# Patient Record
Sex: MALE | Race: WHITE | Employment: UNEMPLOYED | URBAN - METROPOLITAN AREA
[De-identification: names, ages, dates, MRNs, and addresses within clinical notes are randomized per-mention and may not be internally consistent; named-entity substitution may affect disease eponyms.]

---

## 2017-05-24 ENCOUNTER — APPOINTMENT (OUTPATIENT)
Dept: CT IMAGING | Facility: HOSPITAL | Age: 57
End: 2017-05-24
Attending: EMERGENCY MEDICINE
Payer: MEDICAID

## 2017-05-24 ENCOUNTER — APPOINTMENT (OUTPATIENT)
Dept: GENERAL RADIOLOGY | Facility: HOSPITAL | Age: 57
End: 2017-05-24
Attending: EMERGENCY MEDICINE
Payer: MEDICAID

## 2017-05-24 PROCEDURE — 70450 CT HEAD/BRAIN W/O DYE: CPT | Performed by: EMERGENCY MEDICINE

## 2017-05-24 PROCEDURE — 71010 XR CHEST AP PORTABLE  (CPT=71010): CPT | Performed by: EMERGENCY MEDICINE

## 2017-05-24 NOTE — ED NOTES
Call received from [facility] from [facility]. Addressed concerns over condition of patient's home. Nichols stated the house was messy, but not dilapidated and unsafe for him to return home. OXANA notified.

## 2017-05-24 NOTE — ED NOTES
Unable to obtain patient history. He is uncooperative and not answering questions. He is only concerned about the care of his wife. Spoke with wife's caregiver who says patient has been altered since last night.  He called her at 12am asking her for help an

## 2017-05-24 NOTE — ED INITIAL ASSESSMENT (HPI)
Patient presents with EMS after calling 911 three times and hanging up. The  checked on the patient who found him to be altered from his norm. Per EMS, patient's home is dirty with 12 cats and animal waste throughout the house.  He did not allow for

## 2017-05-24 NOTE — ED NOTES
Patient was given the remainder of his personal belongings. Mother is en route to  patient. Patient verbalizes understanding that mother is required to come into the department to pick patient up.

## 2017-05-24 NOTE — ED PROVIDER NOTES
Patient Seen in: [facility] Emergency Department    History   Patient presents with:  Altered Mental Status (neurologic)  Eval-P (psychiatric)    Stated Complaint: ETOH    HPI    Patient called EMS 3 times and hung up.   Careful not to do well being swelling, deformity   Cardiovascular: Regular rate and rhythm, normal S1 and S2, no murmurs, rubs, or gallops   Lungs: Clear to auscultation bilaterally with equal breath sounds, no wheezing, no rhonchi   Abdomen: Positive bowel sounds,  soft, no tendernes RAINBOW DRAW GOLD   RAINBOW DRAW LAVENDER   RAINBOW DRAW LIGHT GREEN      EKG    Rate, intervals and axes as noted on EKG Report.   Rate: 108  Rhythm: Sinus Rhythm  Reading: No acute process          CT brain no acute process  Chest x-ray normal  MDM

## 2017-05-24 NOTE — ED NOTES
Patient assessed. Ambulated to the bathroom with steady gait. Speech is clear and normal. Patient's strength is equal in all extremities. Denies CIWA indicator symptoms.

## 2017-05-24 NOTE — ED NOTES
Received call from patient's wife. She is concerned about his wellbeing. Endorses that the patient has been falling a lot lately and unable to stand.  A few weeks ago he told her he needed something for pain and took some of her Baclofen, which \"doesn't mi

## 2017-05-24 NOTE — ED NOTES
Patient reports he fell 2 days ago, landed on his back, and hit his head on the hardwood floor. ERMD notified. CT head ordered.

## 2017-05-24 NOTE — ED NOTES
Contacted Mike COOK regarding EMS reports of animal waste and concern for safety of living conditions at home. Spoke with Michele Gresham [facility] Department was primary responding department on the call this morning. Will contact [place].

## 2017-05-24 NOTE — ED NOTES
Spoke with [facility] Department; responding officer will return call.

## 2017-05-24 NOTE — ED PROVIDER NOTES
Patient observed in the ER remained awake alert and appropriate able to ambulate steadily and will be picked up by his mother.

## 2018-09-13 ENCOUNTER — CHARTING TRANS (OUTPATIENT)
Dept: OTHER | Age: 58
End: 2018-09-13

## 2018-09-14 ENCOUNTER — CHARTING TRANS (OUTPATIENT)
Dept: OTHER | Age: 58
End: 2018-09-14

## 2018-11-23 ENCOUNTER — IMAGING SERVICES (OUTPATIENT)
Dept: OTHER | Age: 58
End: 2018-11-23

## 2018-12-18 ENCOUNTER — APPOINTMENT (OUTPATIENT)
Dept: CT IMAGING | Facility: HOSPITAL | Age: 58
DRG: 897 | End: 2018-12-18
Attending: EMERGENCY MEDICINE
Payer: MEDICAID

## 2018-12-18 PROBLEM — F10.920 ALCOHOLIC INTOXICATION WITHOUT COMPLICATION (HCC): Status: ACTIVE | Noted: 2018-12-18

## 2018-12-18 PROBLEM — F10.920 ALCOHOLIC INTOXICATION WITHOUT COMPLICATION: Status: ACTIVE | Noted: 2018-12-18

## 2018-12-18 PROCEDURE — 70450 CT HEAD/BRAIN W/O DYE: CPT | Performed by: EMERGENCY MEDICINE

## 2018-12-19 NOTE — PROGRESS NOTES
NURSING DISCHARGE NOTE    Discharged Home via Wheelchair. Accompanied by Support staff  Belongings Taken by patient/family     Reviewed discharge instructions and med rec with patient- verbalized understanding. Removed PIV and tele.  Pt left the unit i

## 2018-12-19 NOTE — PLAN OF CARE
DISCHARGE PLANNING    • Discharge to home or other facility with appropriate resources Progressing        METABOLIC/FLUID AND ELECTROLYTES - ADULT    • Electrolytes maintained within normal limits Progressing        Patient/Family Goals    • Patient/Family

## 2018-12-19 NOTE — PROGRESS NOTES
NURSING ADMISSION NOTE      Patient admitted via Cart  Oriented to room. Safety precautions initiated. Bed in low position. Call light in reach. CIWA protocol, scored 7. 1 mg PO ativan given. Tremors and anxiety noted.   Pt states he's \"anxious

## 2018-12-19 NOTE — PLAN OF CARE
DISCHARGE PLANNING    • Discharge to home or other facility with appropriate resources Progressing        METABOLIC/FLUID AND ELECTROLYTES - ADULT    • Electrolytes maintained within normal limits Progressing        SAFETY ADULT - FALL    • Free from fall

## 2018-12-19 NOTE — ED PROVIDER NOTES
Patient Seen in: [facility] Emergency Department    History   Patient presents with:  Eval-P (psychiatric)    Stated Complaint: ETOH withdraw    HPI    Bettina Keating is a pleasant 60-year-old male presenting to the emergency department for alcohol withdra or erythema.   No submandibular erythema and no tenderness along the sternocleidomastoid and no nuchal rigidity  Lungs are clear to auscultation bilaterally with no wheezes retractions or rhonchi noted  Cardiovascular exam shows a regular rate and rhythm  A treatment for his alcohol abuse issues is a history of withdrawal seizures he would be admitted to the [facility] psychiatric area for further evaluation.             Disposition and Plan     Clinical Impression:  Alcoholic intoxication without compl

## 2018-12-19 NOTE — BH PROGRESS NOTE
Went to see the pt for f/u with his etoh history. He stated he drinks 1 bottle of wine per day. He repeats that he came into the hospital to detox because he was afraid of having a seizure again.   He said, when he tried to detox himself earlier this year

## 2018-12-19 NOTE — H&P
[facility] Hospitalist History and Physical      Patient presents with:  Eval-P (psychiatric)       PCP: Sherrill Avalos MD      History of Present Illness: Patient is a 63year old male with PMH sig for HTN, possible seizure do presents for wanting detox.      He ha and gums normal.   Neck: Supple, symmetrical, trachea midline, no cervical or supraclavicular lymph adenopathy, thyroid: no enlargment/tenderness/nodules appreciated   Lungs:   Clear to auscultation bilaterally.  Normal effort   Chest wall:  No tenderness o no midline shift. There are no acute intraparenchymal brain abnormalities. There is nothing specific for acute infarct. There is no hemorrhage or mass lesion. The craniocervical junction is normal. There is normal gray-white matter differentiation.   SIN

## 2018-12-19 NOTE — ED INITIAL ASSESSMENT (HPI)
Pt here with co alcohol dependence, states hx of DTs with seizures in the past. Last drink was 4 hours ago.

## 2018-12-20 NOTE — PAYOR COMM NOTE
--------------  ADMISSION REVIEW     Payor: [address] [place] [address] #:  [id number]  Authorization Number: [id number]/A    Admit date: 12/18/18  Admit time: 2313       Admitting Physician: Donavan Crain MD  Attending Physician:  No att. providers found  Primary C RAINBOW DRAW LAVENDER   RAINBOW DRAW LIGHT GREEN   RAINBOW DRAW GOLD     EKG    Rate, intervals and axes as noted on EKG Report.   Rate: 94  Rhythm: Sinus Rhythm  Reading: Incomplete right bundle branch block                 MEDICATIONS ADMINISTERED IN [place]

## 2019-01-24 ENCOUNTER — IMAGING SERVICES (OUTPATIENT)
Dept: OTHER | Age: 59
End: 2019-01-24

## 2020-08-17 ENCOUNTER — APPOINTMENT (OUTPATIENT)
Dept: CT IMAGING | Age: 60
DRG: 178 | End: 2020-08-17
Attending: EMERGENCY MEDICINE
Payer: MEDICAID

## 2020-08-17 ENCOUNTER — APPOINTMENT (OUTPATIENT)
Dept: GENERAL RADIOLOGY | Age: 60
DRG: 178 | End: 2020-08-17
Attending: EMERGENCY MEDICINE
Payer: MEDICAID

## 2020-08-17 ENCOUNTER — HOSPITAL ENCOUNTER (INPATIENT)
Facility: HOSPITAL | Age: 60
LOS: 10 days | Discharge: NURSING, REHAB OR LONG-TERM CARE | DRG: 178 | End: 2020-08-28
Attending: EMERGENCY MEDICINE | Admitting: HOSPITALIST
Payer: MEDICAID

## 2020-08-17 DIAGNOSIS — F10.220 ALCOHOL DEPENDENCE WITH UNCOMPLICATED INTOXICATION (HCC): ICD-10-CM

## 2020-08-17 DIAGNOSIS — R00.2 PALPITATIONS: ICD-10-CM

## 2020-08-17 DIAGNOSIS — E87.1 HYPONATREMIA: Primary | ICD-10-CM

## 2020-08-17 DIAGNOSIS — R06.00 DYSPNEA, UNSPECIFIED TYPE: ICD-10-CM

## 2020-08-17 LAB
ALBUMIN SERPL-MCNC: 4.3 G/DL (ref 3.4–5)
ALBUMIN/GLOB SERPL: 1.1 {RATIO} (ref 1–2)
ALP LIVER SERPL-CCNC: 138 U/L (ref 45–117)
ALT SERPL-CCNC: 148 U/L (ref 16–61)
ANION GAP SERPL CALC-SCNC: 9 MMOL/L (ref 0–18)
APAP SERPL-MCNC: <2 UG/ML (ref 10–30)
APTT PPP: 27.6 SECONDS (ref 25.4–36.1)
AST SERPL-CCNC: 289 U/L (ref 15–37)
BASOPHILS # BLD AUTO: 0.03 X10(3) UL (ref 0–0.2)
BASOPHILS NFR BLD AUTO: 0.8 %
BILIRUB SERPL-MCNC: 0.4 MG/DL (ref 0.1–2)
BUN BLD-MCNC: 4 MG/DL (ref 7–18)
BUN/CREAT SERPL: 5.7 (ref 10–20)
CALCIUM BLD-MCNC: 8.9 MG/DL (ref 8.5–10.1)
CHLORIDE SERPL-SCNC: 93 MMOL/L (ref 98–112)
CO2 SERPL-SCNC: 25 MMOL/L (ref 21–32)
CREAT BLD-MCNC: 0.7 MG/DL (ref 0.7–1.3)
D-DIMER: 6.03 UG/ML FEU (ref ?–0.59)
DEPRECATED RDW RBC AUTO: 41.6 FL (ref 35.1–46.3)
EOSINOPHIL # BLD AUTO: 0.01 X10(3) UL (ref 0–0.7)
EOSINOPHIL NFR BLD AUTO: 0.3 %
ERYTHROCYTE [DISTWIDTH] IN BLOOD BY AUTOMATED COUNT: 12.2 % (ref 11–15)
ETHANOL SERPL-MCNC: 283 MG/DL (ref ?–3)
GLOBULIN PLAS-MCNC: 3.8 G/DL (ref 2.8–4.4)
GLUCOSE BLD-MCNC: 92 MG/DL (ref 70–99)
HCT VFR BLD AUTO: 40 % (ref 39–53)
HGB BLD-MCNC: 14.4 G/DL (ref 13–17.5)
IMM GRANULOCYTES # BLD AUTO: 0.02 X10(3) UL (ref 0–1)
IMM GRANULOCYTES NFR BLD: 0.5 %
INR BLD: 0.93 (ref 0.88–1.11)
LYMPHOCYTES # BLD AUTO: 0.46 X10(3) UL (ref 1–4)
LYMPHOCYTES NFR BLD AUTO: 11.9 %
M PROTEIN MFR SERPL ELPH: 8.1 G/DL (ref 6.4–8.2)
MCH RBC QN AUTO: 33.6 PG (ref 26–34)
MCHC RBC AUTO-ENTMCNC: 36 G/DL (ref 31–37)
MCV RBC AUTO: 93.2 FL (ref 80–100)
MONOCYTES # BLD AUTO: 0.49 X10(3) UL (ref 0.1–1)
MONOCYTES NFR BLD AUTO: 12.7 %
NEUTROPHILS # BLD AUTO: 2.85 X10 (3) UL (ref 1.5–7.7)
NEUTROPHILS # BLD AUTO: 2.85 X10(3) UL (ref 1.5–7.7)
NEUTROPHILS NFR BLD AUTO: 73.8 %
OSMOLALITY SERPL CALC.SUM OF ELEC: 261 MOSM/KG (ref 275–295)
PLATELET # BLD AUTO: 100 10(3)UL (ref 150–450)
POTASSIUM SERPL-SCNC: 3.8 MMOL/L (ref 3.5–5.1)
PSA SERPL DL<=0.01 NG/ML-MCNC: 12.4 SECONDS (ref 12–14.3)
RBC # BLD AUTO: 4.29 X10(6)UL (ref 4.3–5.7)
SALICYLATES SERPL-MCNC: 8.8 MG/DL (ref 2.8–20)
SODIUM SERPL-SCNC: 127 MMOL/L (ref 136–145)
TROPONIN I SERPL-MCNC: <0.045 NG/ML (ref ?–0.04)
WBC # BLD AUTO: 3.9 X10(3) UL (ref 4–11)

## 2020-08-17 PROCEDURE — 71275 CT ANGIOGRAPHY CHEST: CPT | Performed by: EMERGENCY MEDICINE

## 2020-08-17 PROCEDURE — 71045 X-RAY EXAM CHEST 1 VIEW: CPT | Performed by: EMERGENCY MEDICINE

## 2020-08-17 RX ORDER — SODIUM CHLORIDE 9 MG/ML
125 INJECTION, SOLUTION INTRAVENOUS CONTINUOUS
Status: DISCONTINUED | OUTPATIENT
Start: 2020-08-17 | End: 2020-08-18

## 2020-08-18 PROBLEM — R06.00 DYSPNEA, UNSPECIFIED TYPE: Status: ACTIVE | Noted: 2020-08-18

## 2020-08-18 PROBLEM — R00.2 PALPITATIONS: Status: ACTIVE | Noted: 2020-08-18

## 2020-08-18 PROBLEM — F10.220 ALCOHOL DEPENDENCE WITH UNCOMPLICATED INTOXICATION (HCC): Status: ACTIVE | Noted: 2020-08-18

## 2020-08-18 LAB
ALBUMIN SERPL-MCNC: 4 G/DL (ref 3.4–5)
ALBUMIN/GLOB SERPL: 1.2 {RATIO} (ref 1–2)
ALP LIVER SERPL-CCNC: 136 U/L (ref 45–117)
ALT SERPL-CCNC: 157 U/L (ref 16–61)
AMPHET UR QL SCN: NEGATIVE
ANION GAP SERPL CALC-SCNC: 10 MMOL/L (ref 0–18)
AST SERPL-CCNC: 345 U/L (ref 15–37)
ATRIAL RATE: 97 BPM
BENZODIAZ UR QL SCN: NEGATIVE
BILIRUB SERPL-MCNC: 0.5 MG/DL (ref 0.1–2)
BILIRUB UR QL STRIP.AUTO: NEGATIVE
BUN BLD-MCNC: 3 MG/DL (ref 7–18)
BUN/CREAT SERPL: 4.1 (ref 10–20)
C DIFF TOX B STL QL: NEGATIVE
CALCIUM BLD-MCNC: 9 MG/DL (ref 8.5–10.1)
CANNABINOIDS UR QL SCN: NEGATIVE
CHLORIDE SERPL-SCNC: 96 MMOL/L (ref 98–112)
CK SERPL-CCNC: 255 U/L (ref 39–308)
CLARITY UR REFRACT.AUTO: CLEAR
CO2 SERPL-SCNC: 24 MMOL/L (ref 21–32)
COCAINE UR QL: NEGATIVE
COLOR UR AUTO: YELLOW
CREAT BLD-MCNC: 0.73 MG/DL (ref 0.7–1.3)
CREAT UR-SCNC: 42.5 MG/DL
CRP SERPL-MCNC: <0.29 MG/DL (ref ?–0.3)
DEPRECATED HBV CORE AB SER IA-ACNC: 751.6 NG/ML (ref 30–530)
DEPRECATED RDW RBC AUTO: 43.9 FL (ref 35.1–46.3)
ERYTHROCYTE [DISTWIDTH] IN BLOOD BY AUTOMATED COUNT: 12.1 % (ref 11–15)
GLOBULIN PLAS-MCNC: 3.3 G/DL (ref 2.8–4.4)
GLUCOSE BLD-MCNC: 153 MG/DL (ref 70–99)
GLUCOSE BLD-MCNC: 68 MG/DL (ref 70–99)
GLUCOSE UR STRIP.AUTO-MCNC: NEGATIVE MG/DL
HAV IGM SER QL: 1.9 MG/DL (ref 1.6–2.6)
HCT VFR BLD AUTO: 38.9 % (ref 39–53)
HGB BLD-MCNC: 13.2 G/DL (ref 13–17.5)
LDH SERPL L TO P-CCNC: 403 U/L
LEUKOCYTE ESTERASE UR QL STRIP.AUTO: NEGATIVE
M PROTEIN MFR SERPL ELPH: 7.3 G/DL (ref 6.4–8.2)
MCH RBC QN AUTO: 33.2 PG (ref 26–34)
MCHC RBC AUTO-ENTMCNC: 33.9 G/DL (ref 31–37)
MCV RBC AUTO: 98 FL (ref 80–100)
MDMA UR QL SCN: NEGATIVE
NITRITE UR QL STRIP.AUTO: NEGATIVE
NT-PROBNP SERPL-MCNC: 55 PG/ML (ref ?–125)
OPIATES UR QL SCN: NEGATIVE
OSMOLALITY SERPL CALC.SUM OF ELEC: 265 MOSM/KG (ref 275–295)
OXYCODONE UR QL SCN: NEGATIVE
P AXIS: 66 DEGREES
P-R INTERVAL: 198 MS
PH UR STRIP.AUTO: 7 [PH] (ref 4.5–8)
PLATELET # BLD AUTO: 74 10(3)UL (ref 150–450)
POTASSIUM SERPL-SCNC: 4.1 MMOL/L (ref 3.5–5.1)
PROCALCITONIN SERPL-MCNC: <0.05 NG/ML (ref ?–0.16)
PROT UR STRIP.AUTO-MCNC: NEGATIVE MG/DL
Q-T INTERVAL: 352 MS
QRS DURATION: 110 MS
QTC CALCULATION (BEZET): 447 MS
R AXIS: -6 DEGREES
RBC # BLD AUTO: 3.97 X10(6)UL (ref 4.3–5.7)
RBC UR QL AUTO: NEGATIVE
SARS-COV-2 RNA RESP QL NAA+PROBE: DETECTED
SODIUM SERPL-SCNC: 130 MMOL/L (ref 136–145)
SP GR UR STRIP.AUTO: 1.01 (ref 1–1.03)
T AXIS: 46 DEGREES
UROBILINOGEN UR STRIP.AUTO-MCNC: <2 MG/DL
VENTRICULAR RATE: 97 BPM
WBC # BLD AUTO: 3.4 X10(3) UL (ref 4–11)

## 2020-08-18 PROCEDURE — HZ2ZZZZ DETOXIFICATION SERVICES FOR SUBSTANCE ABUSE TREATMENT: ICD-10-PCS | Performed by: INTERNAL MEDICINE

## 2020-08-18 RX ORDER — LORAZEPAM 2 MG/ML
2 INJECTION INTRAMUSCULAR ONCE
Status: COMPLETED | OUTPATIENT
Start: 2020-08-18 | End: 2020-08-18

## 2020-08-18 RX ORDER — DEXMEDETOMIDINE HYDROCHLORIDE 4 UG/ML
INJECTION, SOLUTION INTRAVENOUS CONTINUOUS
Status: DISCONTINUED | OUTPATIENT
Start: 2020-08-18 | End: 2020-08-24 | Stop reason: HOSPADM

## 2020-08-18 RX ORDER — BISACODYL 10 MG
10 SUPPOSITORY, RECTAL RECTAL
Status: DISCONTINUED | OUTPATIENT
Start: 2020-08-18 | End: 2020-08-28

## 2020-08-18 RX ORDER — ASPIRIN 81 MG/1
81 TABLET, CHEWABLE ORAL DAILY
Status: DISCONTINUED | OUTPATIENT
Start: 2020-08-18 | End: 2020-08-28

## 2020-08-18 RX ORDER — LORAZEPAM 2 MG/ML
2 INJECTION INTRAMUSCULAR
Status: DISCONTINUED | OUTPATIENT
Start: 2020-08-18 | End: 2020-08-20

## 2020-08-18 RX ORDER — HYDRALAZINE HYDROCHLORIDE 20 MG/ML
10 INJECTION INTRAMUSCULAR; INTRAVENOUS EVERY 4 HOURS PRN
Status: DISCONTINUED | OUTPATIENT
Start: 2020-08-18 | End: 2020-08-28

## 2020-08-18 RX ORDER — LORAZEPAM 1 MG/1
1 TABLET ORAL
Status: DISCONTINUED | OUTPATIENT
Start: 2020-08-18 | End: 2020-08-20

## 2020-08-18 RX ORDER — ACETAMINOPHEN 500 MG
1000 TABLET ORAL ONCE
Status: COMPLETED | OUTPATIENT
Start: 2020-08-18 | End: 2020-08-18

## 2020-08-18 RX ORDER — LORAZEPAM 2 MG/ML
1 INJECTION INTRAMUSCULAR
Status: DISCONTINUED | OUTPATIENT
Start: 2020-08-18 | End: 2020-08-20

## 2020-08-18 RX ORDER — MELATONIN
100 DAILY
Status: DISCONTINUED | OUTPATIENT
Start: 2020-08-18 | End: 2020-08-28

## 2020-08-18 RX ORDER — ONDANSETRON 2 MG/ML
4 INJECTION INTRAMUSCULAR; INTRAVENOUS EVERY 6 HOURS PRN
Status: DISCONTINUED | OUTPATIENT
Start: 2020-08-18 | End: 2020-08-18

## 2020-08-18 RX ORDER — ONDANSETRON 2 MG/ML
4 INJECTION INTRAMUSCULAR; INTRAVENOUS EVERY 4 HOURS PRN
Status: DISCONTINUED | OUTPATIENT
Start: 2020-08-18 | End: 2020-08-28

## 2020-08-18 RX ORDER — METOCLOPRAMIDE HYDROCHLORIDE 5 MG/ML
5 INJECTION INTRAMUSCULAR; INTRAVENOUS EVERY 8 HOURS PRN
Status: DISCONTINUED | OUTPATIENT
Start: 2020-08-18 | End: 2020-08-26

## 2020-08-18 RX ORDER — DOCUSATE SODIUM 100 MG/1
100 CAPSULE, LIQUID FILLED ORAL 2 TIMES DAILY
Status: DISCONTINUED | OUTPATIENT
Start: 2020-08-18 | End: 2020-08-28

## 2020-08-18 RX ORDER — SODIUM PHOSPHATE, DIBASIC AND SODIUM PHOSPHATE, MONOBASIC 7; 19 G/133ML; G/133ML
1 ENEMA RECTAL ONCE AS NEEDED
Status: DISCONTINUED | OUTPATIENT
Start: 2020-08-18 | End: 2020-08-28

## 2020-08-18 RX ORDER — SODIUM CHLORIDE 9 MG/ML
INJECTION, SOLUTION INTRAVENOUS CONTINUOUS
Status: DISCONTINUED | OUTPATIENT
Start: 2020-08-18 | End: 2020-08-18

## 2020-08-18 RX ORDER — POLYETHYLENE GLYCOL 3350 17 G/17G
17 POWDER, FOR SOLUTION ORAL DAILY PRN
Status: DISCONTINUED | OUTPATIENT
Start: 2020-08-18 | End: 2020-08-28

## 2020-08-18 RX ORDER — LORAZEPAM 1 MG/1
2 TABLET ORAL
Status: DISCONTINUED | OUTPATIENT
Start: 2020-08-18 | End: 2020-08-20

## 2020-08-18 NOTE — PROGRESS NOTES
Assumed pt care at 0400. Transfer from [facility]. CIWA protocol. Complains of dyspnea, denies pain. Enhanced precautions in place. (+) covid. Seizure precautions and fall precautions. 1 loose stool reported at home. Will continue to monitor.

## 2020-08-18 NOTE — BH PROGRESS NOTE
Just got off the phone with charge nurse for the pt. She said, he is going to be transferred to ICU. This liaison will talk to the pt at a later time about his etoh history.

## 2020-08-18 NOTE — PROGRESS NOTES
Assumed care of pt at approx 7am. Pt positive COVID plus CIWA for ETOH. Pt with progessively worse CIWA scores, primarily tremors, sweating, and confusion requiring frequent redirection. High fall risk. Very unsteady gait, weak on feet.  Was trying to go ho

## 2020-08-18 NOTE — H&P
[facility] Hospitalist Team  History and Physical       Assessment/Plan:     #etoh abuse, etoh withdrawal  -ciwa protocol  -will transfer to ICU for closer monitoring  -banana bag ordered  -psych liaison when able    #Elevated BP  -hydralazine prn ordered    #Cov Supple, symmetrical, trachea midline   Lungs:   Clear to auscultation bilaterally. Normal effort   Chest wall:  No tenderness or deformity   Heart:  Regular rate and rhythm, S1, S2 normal, no murmur, rub or gallop appreciated   Abdomen:   Soft, non-tender. or attenuation. THORACIC AORTA:  No aneurysm or visible dissection. LUNGS:  Few subpleural blebs in the right lower lobe. Minimal peribronchial thickening bilaterally. There is respiratory motion in the lung bases. Bilateral subsegmental atelectasis. Finalized by (CST): Colbert Libman, MD on 8/17/2020 at 9:42 PM            Outpatient records or previous hospital records reviewed.    [facility] hospitalist to continue to follow patient while in house  A total of 70 minutes taken with patient and coordinating car

## 2020-08-18 NOTE — PLAN OF CARE
Assumed care of patient upon transfer from PMU 5  Patient is COVID positive; isolation precautions observed  ETOH; CIWA protocol  Anxiety    Patient is Alert to self and place, disoriented to time and situation  Hallucinating and restless to agitated, atte

## 2020-08-18 NOTE — CONSULTS
INFECTIOUS DISEASE CONSULTATION    Feroz Rita Patient Status:  Observation    3/5/1960 MRN [id number]   [facility] 5NW-A Attending Aaliyah Anglin DO   Hosp Day # 0 PCP Mark Eason Daily PRN  •  bisacodyl (DULCOLAX) rectal suppository 10 mg, 10 mg, Rectal, Daily PRN  •  Fleet Enema (FLEET) 7-19 GM/118ML enema 133 mL, 1 enema, Rectal, Once PRN  •  ondansetron HCl (ZOFRAN) injection 4 mg, 4 mg, Intravenous, Q6H PRN  •  Metoclopramide H 12.2 12.1   NEPRELIM 2.85  --    WBC 3.9* 3.4*   .0* 74.0*       Recent Labs   Lab 08/17/20  2130 08/18/20  0636   GLU 92 68*   BUN 4* 3*   CREATSERUM 0.70 0.73   GFRAA 119 117   GFRNAA 103 102   CA 8.9 9.0   ALB 4.3 4.0   * 130*   K 3.8 4.1

## 2020-08-18 NOTE — CONSULTS
Critical Care H&P/Consult     NAME: Fariha Dietz - ROOM: [address] [place] [address] - MRN: [id number] - Age: 62year old - :  3/5/1960    Date of Admission: 2020  9:04 PM  Admission Diagnosis: Palpitations [R00.2]  Hyponatremia [E87.1]  Alcohol dependence with (ATIVAN) tab 1 mg, 1 mg, Oral, Q1H PRN    Or  LORazepam (ATIVAN) injection 1 mg, 1 mg, Intravenous, Q1H PRN    Or  LORazepam (ATIVAN) tab 2 mg, 2 mg, Oral, Q1H PRN    Or  LORazepam (ATIVAN) injection 2 mg, 2 mg, Intravenous, Q1H PRN  docusate sodium (COLAC or crackles    Chest wall: No tenderness or deformity. Heart: Regular rate and rhythm, normal S1S2, no murmur. Abdomen: soft, non-tender, non-distended, positive BS. Extremity: No clubbing or cyanosis. no edema   Skin: No rashes or lesions.    Neuro:

## 2020-08-18 NOTE — ED NOTES
Patient states \"I was drinking today. \" patient reports mom  2 days ago and is having a lot of stress

## 2020-08-18 NOTE — PLAN OF CARE
NURSING ADMISSION NOTE    Patient admitted via Cart  Oriented to room. Safety precautions initiated. Bed in low position. Call light in reach. Admission navigator & pta med rec complete. Patient a&ox4. On RA, .   Dyspnea on exertion & at rest.  D

## 2020-08-18 NOTE — ED PROVIDER NOTES
Patient Seen in: THE [facility] Emergency Department In [place]      History   Patient presents with:  Chest Pain Angina    Stated Complaint: difficulty breathing    HPI    Patient is a pleasant 61-year-old male, with history of alcohol abuse, presenting for nontender. Bowel sounds present. SKIN: Skin is pink warm and dry. There are no rashes. EXTREMITIES: The patient moves all 4 extremities freely. No cyanosis, clubbing, or edema. NEUROLOGIC: The patient is awake, alert, and oriented x3. Tremulous.   Cr rate, intervals, and axis as noted on the EKG report. I have reviewed and agree with these readings. Sinus rhythm at 97 bpm.  No acute ST segment changes.        Ct Angiography, Chest (cpt=71275)    Result Date: 8/17/2020  PROCEDURE:  CT ANGIOGRAPHY, CHEST gastroesophageal reflux. Diffuse fatty infiltration of the liver.     Dictated by (CST): Zhang Suarez MD on 8/17/2020 at 11:08 PM     Finalized by (CST): Zhang Suarez MD on 8/17/2020 at 11:11 PM       Xr Chest Ap Portable  (cpt=71045)    Result Date: 8 11:27 PM           Disposition and Plan     Clinical Impression:  Hyponatremia  (primary encounter diagnosis)  Alcohol dependence with uncomplicated intoxication ([facility] [address]  Dyspnea, unspecified type  Palpitations    Disposition:  Admit  8/17/2020 11:27 pm    F

## 2020-08-19 LAB
ALBUMIN SERPL-MCNC: 3.8 G/DL (ref 3.4–5)
ALBUMIN/GLOB SERPL: 1 {RATIO} (ref 1–2)
ALP LIVER SERPL-CCNC: 137 U/L (ref 45–117)
ALT SERPL-CCNC: 137 U/L (ref 16–61)
ANION GAP SERPL CALC-SCNC: 5 MMOL/L (ref 0–18)
AST SERPL-CCNC: 202 U/L (ref 15–37)
BASOPHILS # BLD AUTO: 0.01 X10(3) UL (ref 0–0.2)
BASOPHILS NFR BLD AUTO: 0.2 %
BILIRUB SERPL-MCNC: 0.8 MG/DL (ref 0.1–2)
BUN BLD-MCNC: 6 MG/DL (ref 7–18)
BUN/CREAT SERPL: 7.7 (ref 10–20)
CALCIUM BLD-MCNC: 9.3 MG/DL (ref 8.5–10.1)
CHLORIDE SERPL-SCNC: 94 MMOL/L (ref 98–112)
CO2 SERPL-SCNC: 29 MMOL/L (ref 21–32)
CREAT BLD-MCNC: 0.78 MG/DL (ref 0.7–1.3)
CRP SERPL-MCNC: 0.97 MG/DL (ref ?–0.3)
D-DIMER: 1.23 UG/ML FEU (ref ?–0.59)
DEPRECATED HBV CORE AB SER IA-ACNC: 870.5 NG/ML (ref 30–530)
DEPRECATED RDW RBC AUTO: 41.1 FL (ref 35.1–46.3)
EOSINOPHIL # BLD AUTO: 0 X10(3) UL (ref 0–0.7)
EOSINOPHIL NFR BLD AUTO: 0 %
ERYTHROCYTE [DISTWIDTH] IN BLOOD BY AUTOMATED COUNT: 11.9 % (ref 11–15)
GLOBULIN PLAS-MCNC: 3.8 G/DL (ref 2.8–4.4)
GLUCOSE BLD-MCNC: 76 MG/DL (ref 70–99)
HAV IGM SER QL: 1.9 MG/DL (ref 1.6–2.6)
HCT VFR BLD AUTO: 41.1 % (ref 39–53)
HGB BLD-MCNC: 14.5 G/DL (ref 13–17.5)
IMM GRANULOCYTES # BLD AUTO: 0.01 X10(3) UL (ref 0–1)
IMM GRANULOCYTES NFR BLD: 0.2 %
LDH SERPL L TO P-CCNC: 271 U/L
LYMPHOCYTES # BLD AUTO: 0.37 X10(3) UL (ref 1–4)
LYMPHOCYTES NFR BLD AUTO: 6.7 %
M PROTEIN MFR SERPL ELPH: 7.6 G/DL (ref 6.4–8.2)
MCH RBC QN AUTO: 33.3 PG (ref 26–34)
MCHC RBC AUTO-ENTMCNC: 35.3 G/DL (ref 31–37)
MCV RBC AUTO: 94.5 FL (ref 80–100)
MONOCYTES # BLD AUTO: 0.21 X10(3) UL (ref 0.1–1)
MONOCYTES NFR BLD AUTO: 3.8 %
NEUTROPHILS # BLD AUTO: 4.9 X10 (3) UL (ref 1.5–7.7)
NEUTROPHILS # BLD AUTO: 4.9 X10(3) UL (ref 1.5–7.7)
NEUTROPHILS NFR BLD AUTO: 89.1 %
OSMOLALITY SERPL CALC.SUM OF ELEC: 262 MOSM/KG (ref 275–295)
PHOSPHATE SERPL-MCNC: 3.9 MG/DL (ref 2.5–4.9)
PLATELET # BLD AUTO: 90 10(3)UL (ref 150–450)
POTASSIUM SERPL-SCNC: 3.7 MMOL/L (ref 3.5–5.1)
RBC # BLD AUTO: 4.35 X10(6)UL (ref 4.3–5.7)
SODIUM SERPL-SCNC: 128 MMOL/L (ref 136–145)
WBC # BLD AUTO: 5.5 X10(3) UL (ref 4–11)

## 2020-08-19 RX ORDER — SODIUM CHLORIDE 9 MG/ML
INJECTION, SOLUTION INTRAVENOUS CONTINUOUS
Status: DISCONTINUED | OUTPATIENT
Start: 2020-08-19 | End: 2020-08-25

## 2020-08-19 RX ORDER — FOLIC ACID 1 MG/1
1 TABLET ORAL DAILY
Status: DISCONTINUED | OUTPATIENT
Start: 2020-08-20 | End: 2020-08-28

## 2020-08-19 RX ORDER — ENOXAPARIN SODIUM 100 MG/ML
40 INJECTION SUBCUTANEOUS DAILY
Status: DISCONTINUED | OUTPATIENT
Start: 2020-08-19 | End: 2020-08-28

## 2020-08-19 RX ORDER — POTASSIUM CHLORIDE 20 MEQ/1
40 TABLET, EXTENDED RELEASE ORAL ONCE
Status: COMPLETED | OUTPATIENT
Start: 2020-08-19 | End: 2020-08-19

## 2020-08-19 RX ORDER — VITS A,C,E/LUTEIN/MINERALS 300MCG-200
1 TABLET ORAL DAILY
Status: DISCONTINUED | OUTPATIENT
Start: 2020-08-20 | End: 2020-08-28

## 2020-08-19 NOTE — PROGRESS NOTES
[facility]                INFECTIOUS DISEASE PROGRESS NOTE    Willy Hopper Patient Status:  Inpatient    3/5/1960 MRN [id number]   [facility] 4SW-A Attending Brian Singer, [address] Day # 1 PCP Bhargavi Honeycutt MD       Sub in icu  2. COVID-19 infection  100% on RA  Reports symptoms of fever, cough for a couple days, no hypoxia, and CTA showing mild peribronchial thickening  Fevers, normal PCT     May benefit from Remdesivir early infection, but does not qualify for EUA, sinc

## 2020-08-19 NOTE — PLAN OF CARE
Received at 2000. Disoriented to time and situation, follows commands. CIWA 3-14. Lungs clear on RA. Vitals stable. Precedex for increased agitation. Incontinent of bowel/bladder.

## 2020-08-19 NOTE — PROGRESS NOTES
[facility] Hospitalist Team  Progress Note      Lelia Thea  3/5/1960    Assessment/Plan:       #etoh abuse, etoh withdrawal  -ciwa protocol  -banana bag ordered, cont mv, fa, thiamine  -psych liaison when able  -transferred to ICU with high ciwa scores, no Labs   Lab 08/18/20 2047   PGLU 153*       Meds:   Scheduled:   • [START ON 8/20/2020] Ocuvite-Lutein  1 tablet Oral Daily   • [START ON 5/74/4143] folic acid  1 mg Oral Daily   • aspirin  81 mg Oral Daily   • Thiamine HCl  100 mg Oral Daily   • docusate

## 2020-08-19 NOTE — PROGRESS NOTES
Pharmacy Note:  Route Optimization for Multivitamin, Thiamine, Folic Acid (Banana Bag)         Patient is currently on a banana bag (multivitamin, thiamine 558WR and folic acid 1mg) IVPB every 24 hours.   The patient meets the criteria to convert to the o

## 2020-08-19 NOTE — PROGRESS NOTES
Critical Care Progress Note     Assessment / Plan:  1.  Alcohol withdrawal with DTs  - CIWA with ativan prn  - thiamine, folic acid, MVI  - IVF  2. COVID19  - remains on RA  - no indication for dexamethasone, would start if he requires supplemental oxygen

## 2020-08-20 LAB
ALBUMIN SERPL-MCNC: 3.5 G/DL (ref 3.4–5)
ALBUMIN/GLOB SERPL: 0.9 {RATIO} (ref 1–2)
ALP LIVER SERPL-CCNC: 112 U/L (ref 45–117)
ALT SERPL-CCNC: 134 U/L (ref 16–61)
ANION GAP SERPL CALC-SCNC: 6 MMOL/L (ref 0–18)
AST SERPL-CCNC: 152 U/L (ref 15–37)
BASOPHILS # BLD AUTO: 0 X10(3) UL (ref 0–0.2)
BASOPHILS NFR BLD AUTO: 0 %
BILIRUB SERPL-MCNC: 0.8 MG/DL (ref 0.1–2)
BUN BLD-MCNC: 7 MG/DL (ref 7–18)
BUN/CREAT SERPL: 14 (ref 10–20)
CALCIUM BLD-MCNC: 9 MG/DL (ref 8.5–10.1)
CHLORIDE SERPL-SCNC: 102 MMOL/L (ref 98–112)
CO2 SERPL-SCNC: 23 MMOL/L (ref 21–32)
CREAT BLD-MCNC: 0.5 MG/DL (ref 0.7–1.3)
CRP SERPL-MCNC: 3.72 MG/DL (ref ?–0.3)
D-DIMER: 0.6 UG/ML FEU (ref ?–0.59)
DEPRECATED HBV CORE AB SER IA-ACNC: 736.7 NG/ML (ref 30–530)
DEPRECATED RDW RBC AUTO: 42.1 FL (ref 35.1–46.3)
EOSINOPHIL # BLD AUTO: 0 X10(3) UL (ref 0–0.7)
EOSINOPHIL NFR BLD AUTO: 0 %
ERYTHROCYTE [DISTWIDTH] IN BLOOD BY AUTOMATED COUNT: 12.1 % (ref 11–15)
GLOBULIN PLAS-MCNC: 3.7 G/DL (ref 2.8–4.4)
GLUCOSE BLD-MCNC: 85 MG/DL (ref 70–99)
HAV IGM SER QL: 2.1 MG/DL (ref 1.6–2.6)
HCT VFR BLD AUTO: 40.4 % (ref 39–53)
HGB BLD-MCNC: 14.4 G/DL (ref 13–17.5)
IMM GRANULOCYTES # BLD AUTO: 0.02 X10(3) UL (ref 0–1)
IMM GRANULOCYTES NFR BLD: 0.5 %
LDH SERPL L TO P-CCNC: 212 U/L
LYMPHOCYTES # BLD AUTO: 0.35 X10(3) UL (ref 1–4)
LYMPHOCYTES NFR BLD AUTO: 8.4 %
M PROTEIN MFR SERPL ELPH: 7.2 G/DL (ref 6.4–8.2)
MCH RBC QN AUTO: 33.5 PG (ref 26–34)
MCHC RBC AUTO-ENTMCNC: 35.6 G/DL (ref 31–37)
MCV RBC AUTO: 94 FL (ref 80–100)
MONOCYTES # BLD AUTO: 0.31 X10(3) UL (ref 0.1–1)
MONOCYTES NFR BLD AUTO: 7.5 %
NEUTROPHILS # BLD AUTO: 3.47 X10 (3) UL (ref 1.5–7.7)
NEUTROPHILS # BLD AUTO: 3.47 X10(3) UL (ref 1.5–7.7)
NEUTROPHILS NFR BLD AUTO: 83.6 %
OSMOLALITY SERPL CALC.SUM OF ELEC: 269 MOSM/KG (ref 275–295)
PHOSPHATE SERPL-MCNC: 3.1 MG/DL (ref 2.5–4.9)
PLATELET # BLD AUTO: 88 10(3)UL (ref 150–450)
POTASSIUM SERPL-SCNC: 3.7 MMOL/L (ref 3.5–5.1)
RBC # BLD AUTO: 4.3 X10(6)UL (ref 4.3–5.7)
SODIUM SERPL-SCNC: 131 MMOL/L (ref 136–145)
WBC # BLD AUTO: 4.2 X10(3) UL (ref 4–11)

## 2020-08-20 RX ORDER — LORAZEPAM 2 MG/ML
1 INJECTION INTRAMUSCULAR
Status: DISCONTINUED | OUTPATIENT
Start: 2020-08-20 | End: 2020-08-27

## 2020-08-20 RX ORDER — POTASSIUM CHLORIDE 20 MEQ/1
40 TABLET, EXTENDED RELEASE ORAL ONCE
Status: COMPLETED | OUTPATIENT
Start: 2020-08-20 | End: 2020-08-20

## 2020-08-20 RX ORDER — ACETAMINOPHEN 325 MG/1
650 TABLET ORAL EVERY 6 HOURS PRN
Status: DISCONTINUED | OUTPATIENT
Start: 2020-08-20 | End: 2020-08-28

## 2020-08-20 RX ORDER — LORAZEPAM 2 MG/ML
4 INJECTION INTRAMUSCULAR
Status: DISCONTINUED | OUTPATIENT
Start: 2020-08-20 | End: 2020-08-27

## 2020-08-20 RX ORDER — LORAZEPAM 2 MG/ML
3 INJECTION INTRAMUSCULAR EVERY 30 MIN PRN
Status: DISCONTINUED | OUTPATIENT
Start: 2020-08-20 | End: 2020-08-27

## 2020-08-20 RX ORDER — PANTOPRAZOLE SODIUM 40 MG/1
40 TABLET, DELAYED RELEASE ORAL
Status: DISCONTINUED | OUTPATIENT
Start: 2020-08-20 | End: 2020-08-28

## 2020-08-20 RX ORDER — LORAZEPAM 2 MG/ML
2 INJECTION INTRAMUSCULAR EVERY 30 MIN PRN
Status: DISCONTINUED | OUTPATIENT
Start: 2020-08-20 | End: 2020-08-27

## 2020-08-20 NOTE — PAYOR COMM NOTE
--------------  ADMISSION REVIEW     Payor: Suki Rowland [facility]/ICP  Subscriber #:  [id number]  Authorization Number: [id number]    Admit date: 8/18/20  Admit time: 0157       Admitting Physician: Thalia Quijano MD  Attending Physician:  Eduardo Nicole DO Device None (Room air)       Current:BP (!) 148/95   Pulse 94   Resp 18   SpO2 99%         Physical Exam    Vital signs noted. GENERAL: Patient is awake and alert, resting comfortably on the cart, in no apparent distress.    HEENT: Head is without evidenc TROPONIN I - Normal   PROTHROMBIN TIME (PT) - Normal   PTT, ACTIVATED - Normal   SALICYLATE, SERUM - Normal   CBC WITH DIFFERENTIAL WITH PLATELET    Narrative: The following orders were created for panel order CBC WITH DIFFERENTIAL WITH PLATELET.   Pr thickening involving the mid to distal esophagus which may be related to esophagitis. Consider direct visualization. CARDIAC:  No significant pericardial effusion PLEURA:  No pneumothorax or effusion.   LIMITED ABDOMEN:  Diffuse fatty infiltration of the d-dimer noted. CTA of the chest was ordered. Results of the CTA were negative for PE. Patient with alcohol intoxication/dependence, palpitations, dyspnea, and hyponatremia. IV fluids infusing.   Patient will be transferred to the [facility] campus with:  Chest Pain Angina       PCP: Niya Hinojosa MD      History of Present Illness: Patient is a 62year old male with PMH sig for HTN, etoh abuse, withdrawal deizure who presents to ER with SOB and palpitations.  Pt is currently withdrawal so unable to Neurologic: Moving all extremities spontaneously, no focal deficit appreciated     Data Review:    LABS:   Lab Results   Component Value Date    WBC 3.4 08/18/2020    HGB 13.2 08/18/2020    HCT 38.9 08/18/2020    PLT 74.0 08/18/2020    CREATSERUM 0.73 08 distal esophagus which may be related to esophagitis. Consider direct visualization. CARDIAC:  No significant pericardial effusion PLEURA:  No pneumothorax or effusion. LIMITED ABDOMEN:  Diffuse fatty infiltration of the liver.   BONES:  Multilevel endpl mg     Date Action Dose Route User    8/20/2020 8009 Given 81 mg Oral Jenn Pino RN      Dexmedetomidine HCl in [facility]) 400 MCG/100ML premix infusion     Date Action Dose Route User    8/20/2020 0502 Rate/Dose Change 0.2 mcg/kg/hr × 66. 7 mother's boy friend, and has a house in [place]. He is not working and only goes out to shop. No known contacts. He drinks ETOH, and was placed in etoh withdrawal precautions. SARS-CoV-2 was detected by PCR on ID now yesterday.  CTA shows mild peribroncial Remdesivir early infection, but does not qualify for EUA, since no hypoxia, and does not have radiological findings. CCP also reserved for severe disease.   No role for steroids with symptoms < 7 days and no 02 needs    8/18 PULMONARY CONSULT NOTE  Date of Intravenous, Q4H PRN  aspirin chewable tab 81 mg, 81 mg, Oral, Daily  Thiamine HCl (Vitamin B-1) tab 100 mg, 100 mg, Oral, Daily  LORazepam (ATIVAN) tab 1 mg, 1 mg, Oral, Q1H PRN    Or  LORazepam (ATIVAN) injection 1 mg, 1 mg, Intravenous, Q1H PRN    Or  L confused in bed                HEENT: Normocephalic, without obvious abnormality, atraumatic. Moist oral mucosa                Lungs: Clear to auscultation bilaterally, no focal wheezes or crackles                 Chest wall: No tenderness or deformity. ordered     #Transaminitis  -trending down, likely 2/2 etoh abuse  -diffuse fatty liver noted on ct     #Thrombocytopenia  -also likely 2/2 etoh abuse     #Hyponatremia  -checking urine sodium     #Covid 19 infection  -ID consulted  -no hypoxia currently room     Objective:  Temp:  [97.9 °F (36.6 °C)-100.9 °F (38.3 °C)] 99.4 °F (37.4 °C)  Pulse:  [] 82  Resp:  [13-24] 17  BP: (104-149)/() 125/85        Vital signs:Temp:  [97.9 °F (36.6 °C)-100.9 °F (38.3 °C)] 99.4 °F (37.4 °C)  Pulse:  Jennifer Flores benefit from Remdesivir early infection, but does not qualify for EUA, since no hypoxia, and does not have radiological findings.  CCP also reserved for severe disease.   No role for steroids with symptoms < 7 days and no 02 needs     8/20 PULMONARY NOTE  A

## 2020-08-20 NOTE — OCCUPATIONAL THERAPY NOTE
OCCUPATIONAL THERAPY EVALUATION - INPATIENT     Room Number: 454/454-A  Evaluation Date: 8/20/2020  Type of Evaluation: Initial  Presenting Problem: COVID, ETOH    Physician Order: IP Consult to Occupational Therapy  Reason for Therapy: ADL/IADL Dysfunctio limits    SENSATION  Light touch:  intact    Communication:  Pt is able to communicate all basic needs and wants    Behavioral/Emotional/Social: Pt was participated in and was motivated for therapy today.     RANGE OF MOTION AND STRENGTH ASSESSMENT  Upper e old male admitted on 8/17/2020 for COVID. Complete medical history and occupational profile noted above. Functional outcome measures completed include AMPAC, MMT, ROM.  In this OT evaluation patient presents with the following performance deficits: enduranc supine to sit:  with supervision  Patient will transfer from sit to stand:  with supervision  Patient will transfer to toilet:  with supervision    UE Exercise Program Goal  Patient will be supervision with bilateral AROM HEP (home exercise program).     Ad

## 2020-08-20 NOTE — PLAN OF CARE
Received pt drowsy, confused, able to follow commands. CIWA per protocol. Precedex per [facility] for anxiety. Oliver vest in place for patient safety. On room air with diminished breath sounds. Afebrile. Blood pressure stable. Normal sinus rhythm.  SCD's in place

## 2020-08-20 NOTE — PROGRESS NOTES
Critical Care Progress Note     Assessment / Plan:  1.  Alcohol withdrawal with DTs  - CIWA with ativan prn  - thiamine, folic acid, MVI  - wean off precedex gtt today  - IVF  2. COVID19  - remains on RA  - no indication for dexamethasone, would start if he

## 2020-08-20 NOTE — PROGRESS NOTES
[facility]                INFECTIOUS DISEASE PROGRESS NOTE    Willy Slipper Patient Status:  Inpatient    3/5/1960 MRN [id number]   [facility] 4SW-A Attending Brian Singer, [address] Day # 2 PCP Bhargavi Honeycutt MD       Sub ASSESSMENT/PLAN:  1.  ETOH withdrawal in icu  2. COVID-19 infection  100% on RA  Reports symptoms of fever, cough for a couple days, no hypoxia, and CTA showing mild peribronchial thickening  Low grade temp yesterday, no fever today     May bene

## 2020-08-20 NOTE — PROGRESS NOTES
[facility] Hospitalist Team  Progress Note      Willy Slipper  3/5/1960    Assessment/Plan:       #etoh abuse, etoh withdrawal  -ciwa protocol  -banana bag ordered, cont mv, fa, thiamine  -psych liaison when able  -transferred to ICU with high ciwa scores, no --  1.9 1.9 2.1   PHOS  --   --  3.9 3.1   GLU 92 68* 76 85       Recent Labs   Lab 08/17/20  2130 08/18/20  0636 08/19/20  0535 08/20/20  0450   * 157* 137* 134*   * 345* 202* 152*   ALB 4.3 4.0 3.8 3.5   LDH  --  403* 271* 212       Recent

## 2020-08-20 NOTE — PLAN OF CARE
Received pt this am aox3-4. Periods of forgetfulness, but overall intact. Still with some tremors and restlessness, but posey vest taken off this am, and precedex gtt shut off shortly after.        OOB to chair with therapy, advised sit-to-stand device for

## 2020-08-20 NOTE — PHYSICAL THERAPY NOTE
PHYSICAL THERAPY EVALUATION - INPATIENT     Room Number: 454/454-A  Evaluation Date: 8/20/2020  Type of Evaluation: Initial  Physician Order: PT Eval and Treat    Presenting Problem: ETOH withdrawal, +COVID19  Reason for Therapy: Mobility Dysfunction implement solutions    RANGE OF MOTION AND STRENGTH ASSESSMENT  See OT note for UE assessment      Lower extremity ROM is within functional limits     Unable to perform MMT 2/2 cognition.   Pt demos grossly >3/5 by observation alone      BALANCE  Static Sit use sit to stand due to post lean and inability to advance BLEs.       Writer wore droplet mask, gown, goggles, hairnet, gloves throughout entire session      Exercise/Education Provided:  Bed mobility  Body mechanics  Functional activity tolerated  Posture supervision     Goal #2 Patient is able to demonstrate transfers Sit to/from Stand at assistance level: supervision     Goal #3 Patient is able to ambulate 150 feet with assist device: least restrictive device at assistance level: supervision     Goal #4

## 2020-08-21 LAB
ALBUMIN SERPL-MCNC: 3.4 G/DL (ref 3.4–5)
ALBUMIN/GLOB SERPL: 0.9 {RATIO} (ref 1–2)
ALP LIVER SERPL-CCNC: 113 U/L (ref 45–117)
ALT SERPL-CCNC: 106 U/L (ref 16–61)
ANION GAP SERPL CALC-SCNC: 5 MMOL/L (ref 0–18)
AST SERPL-CCNC: 99 U/L (ref 15–37)
BASOPHILS # BLD AUTO: 0.01 X10(3) UL (ref 0–0.2)
BASOPHILS NFR BLD AUTO: 0.3 %
BILIRUB SERPL-MCNC: 0.9 MG/DL (ref 0.1–2)
BUN BLD-MCNC: 5 MG/DL (ref 7–18)
BUN/CREAT SERPL: 6.2 (ref 10–20)
CALCIUM BLD-MCNC: 8.7 MG/DL (ref 8.5–10.1)
CHLORIDE SERPL-SCNC: 99 MMOL/L (ref 98–112)
CO2 SERPL-SCNC: 26 MMOL/L (ref 21–32)
CREAT BLD-MCNC: 0.81 MG/DL (ref 0.7–1.3)
CRP SERPL-MCNC: 2.76 MG/DL (ref ?–0.3)
D-DIMER: 0.45 UG/ML FEU (ref ?–0.59)
DEPRECATED HBV CORE AB SER IA-ACNC: 680.4 NG/ML (ref 30–530)
DEPRECATED RDW RBC AUTO: 41.6 FL (ref 35.1–46.3)
EOSINOPHIL # BLD AUTO: 0.02 X10(3) UL (ref 0–0.7)
EOSINOPHIL NFR BLD AUTO: 0.6 %
ERYTHROCYTE [DISTWIDTH] IN BLOOD BY AUTOMATED COUNT: 11.9 % (ref 11–15)
GLOBULIN PLAS-MCNC: 3.6 G/DL (ref 2.8–4.4)
GLUCOSE BLD-MCNC: 76 MG/DL (ref 70–99)
HCT VFR BLD AUTO: 40.6 % (ref 39–53)
HGB BLD-MCNC: 14 G/DL (ref 13–17.5)
IMM GRANULOCYTES # BLD AUTO: 0.01 X10(3) UL (ref 0–1)
IMM GRANULOCYTES NFR BLD: 0.3 %
LDH SERPL L TO P-CCNC: 201 U/L
LYMPHOCYTES # BLD AUTO: 0.24 X10(3) UL (ref 1–4)
LYMPHOCYTES NFR BLD AUTO: 7.5 %
M PROTEIN MFR SERPL ELPH: 7 G/DL (ref 6.4–8.2)
MCH RBC QN AUTO: 32.9 PG (ref 26–34)
MCHC RBC AUTO-ENTMCNC: 34.5 G/DL (ref 31–37)
MCV RBC AUTO: 95.3 FL (ref 80–100)
MONOCYTES # BLD AUTO: 0.41 X10(3) UL (ref 0.1–1)
MONOCYTES NFR BLD AUTO: 12.9 %
NEUTROPHILS # BLD AUTO: 2.5 X10 (3) UL (ref 1.5–7.7)
NEUTROPHILS # BLD AUTO: 2.5 X10(3) UL (ref 1.5–7.7)
NEUTROPHILS NFR BLD AUTO: 78.4 %
OSMOLALITY SERPL CALC.SUM OF ELEC: 266 MOSM/KG (ref 275–295)
PLATELET # BLD AUTO: 82 10(3)UL (ref 150–450)
POTASSIUM SERPL-SCNC: 3.7 MMOL/L (ref 3.5–5.1)
RBC # BLD AUTO: 4.26 X10(6)UL (ref 4.3–5.7)
SODIUM SERPL-SCNC: 130 MMOL/L (ref 136–145)
WBC # BLD AUTO: 3.2 X10(3) UL (ref 4–11)

## 2020-08-21 RX ORDER — POTASSIUM CHLORIDE 20 MEQ/1
40 TABLET, EXTENDED RELEASE ORAL ONCE
Status: COMPLETED | OUTPATIENT
Start: 2020-08-21 | End: 2020-08-21

## 2020-08-21 NOTE — PAYOR COMM NOTE
REMAINS IN ICU    CONTINUED STAY REVIEW    Payor: [facility]/IC[facility]  Subscriber #:  [id number]  Authorization Number: [id number]    Admit date: 8/18/20  Admit time: [address]    Admitting Physician: Mike Morgan MD  Attending Physician:  Aleah Dolan 8/20/2020 1757 Given 4 mg Intravenous Dalila Pino RN      Ocuvite-Lutein (OCUVITE - EYE VITAMIN) tab 1 tablet     Date Action Dose Route User    8/21/2020 1028 Given 1 tablet Oral Dalila Pino RN      Pantoprazole Sodium (PROTONIX) EC ta S1S2                          Abdomen: soft, non-tender, non-distended, positive BS.                         Extremity: No clubbing or cyanosis.  no edema                          Skin: No rashes or lesions.              Lab Results   Component Value Date

## 2020-08-21 NOTE — PROGRESS NOTES
Irene 8 Patient Status:  Inpatient    3/5/1960 MRN [id number]   [facility] 4SW-A Attending Keenan Baeza, [address] [facility] Day # 3 PCP Jay Escamilla MD     Critical Care Progress Note     Date of Admission: 20 PRN  •  Metoclopramide HCl (REGLAN) injection 5 mg, 5 mg, Intravenous, Q8H PRN  •  hydrALAzine HCl (APRESOLINE) injection 10 mg, 10 mg, Intravenous, Q4H PRN  •  sodium chloride 0.9% IV bolus 500 mL, 500 mL, Intravenous, PRN  •  Dexmedetomidine HCl in NaCl 08/21/2020     08/21/2020    AST 99 08/21/2020    BILT 0.9 08/21/2020    ALB 3.4 08/21/2020    TP 7.0 08/21/2020     Lab Results   Component Value Date    INR 0.93 08/17/2020    INR 0.94 12/18/2018    INR 1.00 05/24/2017           COVID-19 Lab Resul

## 2020-08-21 NOTE — PLAN OF CARE
Received pt drowsy, confused, able to follow commands. CIWA per protocol. Precedex per [facility] for anxiety. Oliver vest in place for patient safety. On room air with diminished breath sounds. Temperature max 100.1. Blood pressure stable. Normal sinus rhythm.  SC

## 2020-08-21 NOTE — PROGRESS NOTES
[facility] Hospitalist Team  Progress Note      Fariha Dietz  3/5/1960    Assessment/Plan:       #etoh abuse, etoh withdrawal  -ciwa protocol  -banana bag ordered, cont mv, fa, thiamine  -psych liaison when able  -transferred to ICU with high ciwa scores, no 134* 106*   * 345* 202* 152* 99*   ALB 4.3 4.0 3.8 3.5 3.4   LDH  --  403* 271* 212 201       Recent Labs   Lab 08/18/20 2047   PGLU 153*       Meds:   Scheduled:   • Pantoprazole Sodium  40 mg Oral QAM AC   • Ocuvite-Lutein  1 tablet Oral Daily

## 2020-08-21 NOTE — PLAN OF CARE
Received pt this am wakes, some confusion. On precedex gtt, will attempt to wean. Vss. Slight cough, non productive.        Problem: Safety Risk - Non-Violent Restraints  Goal: Patient will remain free from self-harm  Description  INTERVENTIONS:  - Apply th

## 2020-08-21 NOTE — CM/SW NOTE
sw consult order noted for DC planning. bran reviewed chart- pt evaluated by therapy and JAMES is being recommended. sw contacted RN and pt is not appropriate to contact regarding JAMES planning at this time as he is still withdrawing.  sw to follow

## 2020-08-21 NOTE — PROGRESS NOTES
[facility]                INFECTIOUS DISEASE PROGRESS NOTE    Maria Isabel Six Patient Status:  Inpatient    3/5/1960 MRN [id number]   [facility] 4SW-A Attending Jonnie Cervantes, [address] [facility] Day # 3 PCP Lali Carrillo MD       Sub withdrawal in icu  2. COVID-19 infection  100% on RA  Reports symptoms of fever, cough beginning a couple days prior to admission  Symptoms around 8/15  Tested positive 8/17  Currently 100% on room air, no fever  -no signs of severe infection now 6 days in

## 2020-08-22 LAB
ALBUMIN SERPL-MCNC: 3.2 G/DL (ref 3.4–5)
ALBUMIN/GLOB SERPL: 0.9 {RATIO} (ref 1–2)
ALP LIVER SERPL-CCNC: 98 U/L (ref 45–117)
ALT SERPL-CCNC: 79 U/L (ref 16–61)
ANION GAP SERPL CALC-SCNC: 5 MMOL/L (ref 0–18)
AST SERPL-CCNC: 67 U/L (ref 15–37)
BASOPHILS # BLD AUTO: 0.02 X10(3) UL (ref 0–0.2)
BASOPHILS NFR BLD AUTO: 0.7 %
BILIRUB SERPL-MCNC: 0.6 MG/DL (ref 0.1–2)
BUN BLD-MCNC: 7 MG/DL (ref 7–18)
BUN/CREAT SERPL: 10.3 (ref 10–20)
CALCIUM BLD-MCNC: 8.7 MG/DL (ref 8.5–10.1)
CHLORIDE SERPL-SCNC: 99 MMOL/L (ref 98–112)
CO2 SERPL-SCNC: 26 MMOL/L (ref 21–32)
CREAT BLD-MCNC: 0.68 MG/DL (ref 0.7–1.3)
DEPRECATED RDW RBC AUTO: 42 FL (ref 35.1–46.3)
EOSINOPHIL # BLD AUTO: 0.02 X10(3) UL (ref 0–0.7)
EOSINOPHIL NFR BLD AUTO: 0.7 %
ERYTHROCYTE [DISTWIDTH] IN BLOOD BY AUTOMATED COUNT: 11.9 % (ref 11–15)
GLOBULIN PLAS-MCNC: 3.7 G/DL (ref 2.8–4.4)
GLUCOSE BLD-MCNC: 76 MG/DL (ref 70–99)
HAV IGM SER QL: 1.8 MG/DL (ref 1.6–2.6)
HCT VFR BLD AUTO: 40.9 % (ref 39–53)
HGB BLD-MCNC: 14.1 G/DL (ref 13–17.5)
IMM GRANULOCYTES # BLD AUTO: 0.02 X10(3) UL (ref 0–1)
IMM GRANULOCYTES NFR BLD: 0.7 %
LYMPHOCYTES # BLD AUTO: 0.48 X10(3) UL (ref 1–4)
LYMPHOCYTES NFR BLD AUTO: 17.1 %
M PROTEIN MFR SERPL ELPH: 6.9 G/DL (ref 6.4–8.2)
MCH RBC QN AUTO: 33.3 PG (ref 26–34)
MCHC RBC AUTO-ENTMCNC: 34.5 G/DL (ref 31–37)
MCV RBC AUTO: 96.7 FL (ref 80–100)
MONOCYTES # BLD AUTO: 0.45 X10(3) UL (ref 0.1–1)
MONOCYTES NFR BLD AUTO: 16.1 %
NEUTROPHILS # BLD AUTO: 1.81 X10 (3) UL (ref 1.5–7.7)
NEUTROPHILS # BLD AUTO: 1.81 X10(3) UL (ref 1.5–7.7)
NEUTROPHILS NFR BLD AUTO: 64.7 %
OSMOLALITY SERPL CALC.SUM OF ELEC: 267 MOSM/KG (ref 275–295)
PHOSPHATE SERPL-MCNC: 2.9 MG/DL (ref 2.5–4.9)
PLATELET # BLD AUTO: 86 10(3)UL (ref 150–450)
POTASSIUM SERPL-SCNC: 3.2 MMOL/L (ref 3.5–5.1)
POTASSIUM SERPL-SCNC: 4.5 MMOL/L (ref 3.5–5.1)
RBC # BLD AUTO: 4.23 X10(6)UL (ref 4.3–5.7)
SODIUM SERPL-SCNC: 130 MMOL/L (ref 136–145)
WBC # BLD AUTO: 2.8 X10(3) UL (ref 4–11)

## 2020-08-22 RX ORDER — MAGNESIUM OXIDE 400 MG (241.3 MG MAGNESIUM) TABLET
400 TABLET ONCE
Status: COMPLETED | OUTPATIENT
Start: 2020-08-22 | End: 2020-08-22

## 2020-08-22 RX ORDER — POTASSIUM CHLORIDE 20 MEQ/1
40 TABLET, EXTENDED RELEASE ORAL EVERY 4 HOURS
Status: COMPLETED | OUTPATIENT
Start: 2020-08-22 | End: 2020-08-22

## 2020-08-22 NOTE — PROGRESS NOTES
[facility]                INFECTIOUS DISEASE PROGRESS NOTE    [facility] File Patient Status:  Inpatient    3/5/1960 MRN [id number]   [facility] 4SW-A Attending Dion Alfaro, [address] [facility] Day # 4 PCP Francesca Bass MD       Sub BLOOD CULTURE     Status: None (Preliminary result)    Collection Time: 08/18/20 11:39 AM   Result Value Ref Range    Blood Culture Result No Growth 3 Days N/A             ASSESSMENT/PLAN:    1.  ETOH withdrawal in icu    2. COVID-19 infection  Reports symp

## 2020-08-22 NOTE — PLAN OF CARE
Received pt drowsy, confused, able to follow commands. CIWA per protocol. Precedex resumed per [facility] for anxiety. Stephenson vest in place for patient safety. On room air with diminished breath sounds. Temperature max 100.4. Tylenol PRN per MAR.  Blood pressure st

## 2020-08-22 NOTE — PLAN OF CARE
Took over pt care this afternoon. Stopped Precedex gtt. Will monitor CIWA level closely. Pt continues to be disoriented despite reorientation. Impulsive. Attempting to get out of bed. Oliver vest keeps pt in bed. Will monitor for need of Ativan.  Pt ate dinn falls.  - [place] fall precautions as indicated by assessment.  - Educate pt/family on patient safety including physical limitations  - Instruct pt to call for assistance with activity based on assessment  - Modify environment to reduce risk of injury  - function  - Promote sitting position while performing ADLs such as feeding, grooming, and bathing  - Educate and encourage patient/family in tolerated functional activity level and precautions during self-care     Outcome: Not Progressing

## 2020-08-22 NOTE — PROGRESS NOTES
Irene 8 Patient Status:  Inpatient    3/5/1960 MRN [id number]   [place] [facility] 4SW-A Attending Gemma Arana, [address] [facility] Day # 4 PCP Dinesh Bear MD     Critical Care Progress Note     Date of Admission: 20 mg, 10 mg, Intravenous, Q4H PRN  •  sodium chloride 0.9% IV bolus 500 mL, 500 mL, Intravenous, PRN  •  Dexmedetomidine HCl in NaCl (PRECEDEX) 400 MCG/100ML premix infusion, 0.2-1.5 mcg/kg/hr, Intravenous, Continuous     OBJECTIVE:  /79 (BP Location: PBNP  --  55       Creatinine Kinase  Recent Labs   Lab 08/18/20  0636          Inflammatory Markers  Recent Labs   Lab 08/19/20  0535 08/20/20  0450 08/21/20  0432   CRP 0.97* 3.72* 2.76*   CLAUDIO 870.5* 736.7* 680.4*   * 212 201   DDIMER 1.23

## 2020-08-22 NOTE — PROGRESS NOTES
[facility] Hospitalist Team  Progress Note      Stephani Betts  3/5/1960    Assessment/Plan:       #etoh abuse, etoh withdrawal  -[facility] protocol  -banana bag ordered, cont mv, fa, thiamine  -psych liaison when able  - precedex gtt     #Elevated BP, now improved Recent Labs   Lab 08/18/20 2047   PGLU 153*       Meds:   Scheduled:   • Potassium Chloride ER  40 mEq Oral Q4H   • Pantoprazole Sodium  40 mg Oral QAM AC   • Ocuvite-Lutein  1 tablet Oral Daily   • folic acid  1 mg Oral Daily   • enoxaparin  40 mg

## 2020-08-23 ENCOUNTER — APPOINTMENT (OUTPATIENT)
Dept: GENERAL RADIOLOGY | Facility: HOSPITAL | Age: 60
DRG: 178 | End: 2020-08-23
Attending: INTERNAL MEDICINE
Payer: MEDICAID

## 2020-08-23 LAB
ALBUMIN SERPL-MCNC: 3.1 G/DL (ref 3.4–5)
ALBUMIN/GLOB SERPL: 0.9 {RATIO} (ref 1–2)
ALP LIVER SERPL-CCNC: 90 U/L (ref 45–117)
ALT SERPL-CCNC: 59 U/L (ref 16–61)
ANION GAP SERPL CALC-SCNC: 6 MMOL/L (ref 0–18)
AST SERPL-CCNC: 42 U/L (ref 15–37)
BASOPHILS # BLD AUTO: 0.02 X10(3) UL (ref 0–0.2)
BASOPHILS NFR BLD AUTO: 0.7 %
BILIRUB SERPL-MCNC: 0.4 MG/DL (ref 0.1–2)
BUN BLD-MCNC: 7 MG/DL (ref 7–18)
BUN/CREAT SERPL: 11.1 (ref 10–20)
CALCIUM BLD-MCNC: 8.2 MG/DL (ref 8.5–10.1)
CHLORIDE SERPL-SCNC: 100 MMOL/L (ref 98–112)
CO2 SERPL-SCNC: 26 MMOL/L (ref 21–32)
CREAT BLD-MCNC: 0.63 MG/DL (ref 0.7–1.3)
DEPRECATED RDW RBC AUTO: 41.6 FL (ref 35.1–46.3)
EOSINOPHIL # BLD AUTO: 0.04 X10(3) UL (ref 0–0.7)
EOSINOPHIL NFR BLD AUTO: 1.5 %
ERYTHROCYTE [DISTWIDTH] IN BLOOD BY AUTOMATED COUNT: 11.9 % (ref 11–15)
GLOBULIN PLAS-MCNC: 3.5 G/DL (ref 2.8–4.4)
GLUCOSE BLD-MCNC: 81 MG/DL (ref 70–99)
HAV IGM SER QL: 1.8 MG/DL (ref 1.6–2.6)
HCT VFR BLD AUTO: 38.2 % (ref 39–53)
HGB BLD-MCNC: 13.1 G/DL (ref 13–17.5)
IMM GRANULOCYTES # BLD AUTO: 0.02 X10(3) UL (ref 0–1)
IMM GRANULOCYTES NFR BLD: 0.7 %
LYMPHOCYTES # BLD AUTO: 0.54 X10(3) UL (ref 1–4)
LYMPHOCYTES NFR BLD AUTO: 20.1 %
M PROTEIN MFR SERPL ELPH: 6.6 G/DL (ref 6.4–8.2)
MCH RBC QN AUTO: 32.8 PG (ref 26–34)
MCHC RBC AUTO-ENTMCNC: 34.3 G/DL (ref 31–37)
MCV RBC AUTO: 95.7 FL (ref 80–100)
MONOCYTES # BLD AUTO: 0.54 X10(3) UL (ref 0.1–1)
MONOCYTES NFR BLD AUTO: 20.1 %
NEUTROPHILS # BLD AUTO: 1.52 X10 (3) UL (ref 1.5–7.7)
NEUTROPHILS # BLD AUTO: 1.52 X10(3) UL (ref 1.5–7.7)
NEUTROPHILS NFR BLD AUTO: 56.9 %
OSMOLALITY SERPL CALC.SUM OF ELEC: 271 MOSM/KG (ref 275–295)
PLATELET # BLD AUTO: 104 10(3)UL (ref 150–450)
POTASSIUM SERPL-SCNC: 3.8 MMOL/L (ref 3.5–5.1)
RBC # BLD AUTO: 3.99 X10(6)UL (ref 4.3–5.7)
SODIUM SERPL-SCNC: 132 MMOL/L (ref 136–145)
WBC # BLD AUTO: 2.7 X10(3) UL (ref 4–11)

## 2020-08-23 PROCEDURE — 71045 X-RAY EXAM CHEST 1 VIEW: CPT | Performed by: INTERNAL MEDICINE

## 2020-08-23 RX ORDER — MAGNESIUM OXIDE 400 MG (241.3 MG MAGNESIUM) TABLET
400 TABLET ONCE
Status: COMPLETED | OUTPATIENT
Start: 2020-08-23 | End: 2020-08-23

## 2020-08-23 RX ORDER — POTASSIUM CHLORIDE 20 MEQ/1
40 TABLET, EXTENDED RELEASE ORAL ONCE
Status: COMPLETED | OUTPATIENT
Start: 2020-08-23 | End: 2020-08-23

## 2020-08-23 NOTE — PROGRESS NOTES
Irene 8 Patient Status:  Inpatient    3/5/1960 MRN [id number]   [facility] 4SW-A Attending Benny Flaherty, [address] [facility] Day # 5 PCP Gayathri Hood MD     Critical Care Progress Note     Date of Admission: 20 mL, 1 enema, Rectal, Once PRN  •  Metoclopramide HCl (REGLAN) injection 5 mg, 5 mg, Intravenous, Q8H PRN  •  hydrALAzine HCl (APRESOLINE) injection 10 mg, 10 mg, Intravenous, Q4H PRN  •  sodium chloride 0.9% IV bolus 500 mL, 500 mL, Intravenous, PRN  •  Salazar Oconnell ALKPHO 90 08/23/2020    ALT 59 08/23/2020    AST 42 08/23/2020    BILT 0.4 08/23/2020    ALB 3.1 08/23/2020    TP 6.6 08/23/2020     Lab Results   Component Value Date    INR 0.93 08/17/2020    INR 0.94 12/18/2018    INR 1.00 05/24/2017           COVID-19

## 2020-08-23 NOTE — PLAN OF CARE
Took over care this am. Weaned of Precedex gtt. Up to chair with meals. Caitlin well. Still confused at times. Impulsive at times.  Needs vest.           Problem: Safety Risk - Non-Violent Restraints  Goal: Patient will remain free from self-harm  Description environment to reduce risk of injury  - Provide assistive devices as appropriate  - Consider OT/PT consult to assist with strengthening/mobility  - Encourage toileting schedule  Outcome: Not Progressing     Problem: DISCHARGE PLANNING  Goal: Discharge to h Not Progressing

## 2020-08-23 NOTE — PLAN OF CARE
Pt received up in chair on room air. + nonproductive cough. Febrile 101. 2- Dr. Jefferson Sotomayor notified and blood cultures drawn x2. Pt unsteady with stand and pivot back to bed. Tremors noted. C/o headache- prn tylenol given. Ciwa per protocol.   Oliver luna d/t

## 2020-08-23 NOTE — PLAN OF CARE
Assumed care at shift change. Pt alert and oriented x3-4, forgetful. Ciwa <6. No ativan given. Slight tremors. Tmax 99. On room air. SR/ST per tele. BP stable. Pulses palpable. SCDs. Tolerating regular diet. 1 soft bm. Voiding.  Walked to the bathroom with

## 2020-08-24 LAB
ALBUMIN SERPL-MCNC: 3.1 G/DL (ref 3.4–5)
ALBUMIN/GLOB SERPL: 0.9 {RATIO} (ref 1–2)
ALP LIVER SERPL-CCNC: 90 U/L (ref 45–117)
ALT SERPL-CCNC: 52 U/L (ref 16–61)
ANION GAP SERPL CALC-SCNC: 10 MMOL/L (ref 0–18)
AST SERPL-CCNC: 43 U/L (ref 15–37)
BASOPHILS # BLD AUTO: 0.01 X10(3) UL (ref 0–0.2)
BASOPHILS NFR BLD AUTO: 0.4 %
BILIRUB SERPL-MCNC: 0.5 MG/DL (ref 0.1–2)
BUN BLD-MCNC: 6 MG/DL (ref 7–18)
BUN/CREAT SERPL: 11.8 (ref 10–20)
CALCIUM BLD-MCNC: 8.4 MG/DL (ref 8.5–10.1)
CHLORIDE SERPL-SCNC: 96 MMOL/L (ref 98–112)
CO2 SERPL-SCNC: 23 MMOL/L (ref 21–32)
CREAT BLD-MCNC: 0.51 MG/DL (ref 0.7–1.3)
DEPRECATED RDW RBC AUTO: 39.9 FL (ref 35.1–46.3)
EOSINOPHIL # BLD AUTO: 0.08 X10(3) UL (ref 0–0.7)
EOSINOPHIL NFR BLD AUTO: 3.3 %
ERYTHROCYTE [DISTWIDTH] IN BLOOD BY AUTOMATED COUNT: 11.6 % (ref 11–15)
GLOBULIN PLAS-MCNC: 3.4 G/DL (ref 2.8–4.4)
GLUCOSE BLD-MCNC: 79 MG/DL (ref 70–99)
HAV IGM SER QL: 1.6 MG/DL (ref 1.6–2.6)
HCT VFR BLD AUTO: 37.2 % (ref 39–53)
HGB BLD-MCNC: 13 G/DL (ref 13–17.5)
IMM GRANULOCYTES # BLD AUTO: 0.02 X10(3) UL (ref 0–1)
IMM GRANULOCYTES NFR BLD: 0.8 %
LYMPHOCYTES # BLD AUTO: 0.56 X10(3) UL (ref 1–4)
LYMPHOCYTES NFR BLD AUTO: 23.2 %
M PROTEIN MFR SERPL ELPH: 6.5 G/DL (ref 6.4–8.2)
MCH RBC QN AUTO: 32.7 PG (ref 26–34)
MCHC RBC AUTO-ENTMCNC: 34.9 G/DL (ref 31–37)
MCV RBC AUTO: 93.5 FL (ref 80–100)
MONOCYTES # BLD AUTO: 0.61 X10(3) UL (ref 0.1–1)
MONOCYTES NFR BLD AUTO: 25.3 %
NEUTROPHILS # BLD AUTO: 1.13 X10 (3) UL (ref 1.5–7.7)
NEUTROPHILS # BLD AUTO: 1.13 X10(3) UL (ref 1.5–7.7)
NEUTROPHILS NFR BLD AUTO: 47 %
OSMOLALITY SERPL CALC.SUM OF ELEC: 265 MOSM/KG (ref 275–295)
PLATELET # BLD AUTO: 141 10(3)UL (ref 150–450)
POTASSIUM SERPL-SCNC: 3.5 MMOL/L (ref 3.5–5.1)
POTASSIUM SERPL-SCNC: 4.1 MMOL/L (ref 3.5–5.1)
RBC # BLD AUTO: 3.98 X10(6)UL (ref 4.3–5.7)
SODIUM SERPL-SCNC: 129 MMOL/L (ref 136–145)
WBC # BLD AUTO: 2.4 X10(3) UL (ref 4–11)

## 2020-08-24 RX ORDER — MAGNESIUM OXIDE 400 MG (241.3 MG MAGNESIUM) TABLET
400 TABLET ONCE
Status: COMPLETED | OUTPATIENT
Start: 2020-08-24 | End: 2020-08-24

## 2020-08-24 RX ORDER — POTASSIUM CHLORIDE 20 MEQ/1
40 TABLET, EXTENDED RELEASE ORAL EVERY 4 HOURS
Status: COMPLETED | OUTPATIENT
Start: 2020-08-24 | End: 2020-08-24

## 2020-08-24 NOTE — DIETARY NOTE
[address]     Admitting diagnosis:  Palpitations [R00.2]  Hyponatremia [E87.1]  Alcohol dependence with uncomplicated intoxication ([facility] [address] [F10.220]  Dyspnea, unspecified type [R06.00]    Ht: 177.8 cm (5' 10\")

## 2020-08-24 NOTE — PROGRESS NOTES
[facility] Hospitalist Team  Progress Note      Farida Soni  3/5/1960    Assessment/Plan:     #etoh abuse, etoh withdrawal   - per RN not actively withdrawing  -psych liaison when able       #Elevated BP, now improved  -hydralazine prn ordered    #Esophagit 08/18/20  0636 08/19/20  0535 08/20/20  0450 08/21/20  0432 08/22/20  0554 08/23/20  0427 08/24/20  0448   * 137* 134* 106* 79* 59 52   * 202* 152* 99* 67* 42* 43*   ALB 4.0 3.8 3.5 3.4 3.2* 3.1* 3.1*   * 271* 212 201  --   --   --

## 2020-08-24 NOTE — PLAN OF CARE
Pt received resting in bed. C/o mild headache- prn tylenol  + nonproductive cough. Denies shortness of breath or chest pain. Voids via urinal but also incontinent at times.   Refused colace this evening  Tolerating po intake  Declined oral care for this ev

## 2020-08-24 NOTE — PROGRESS NOTES
[facility] PULMONARY/CRITICAL CARE    S: Pt is doing well. He has a cough but not too bad.  Denies shortness of breath     Meds:  • Potassium Chloride ER  40 mEq Oral Q4H   • magnesium oxide  400 mg Oral Once   • Pantoprazole Sodium  40 mg Oral QAM AC   • Ocuvite- 08/18/20  0636   TROP <0.045  --    CK  --  255       Recent Labs   Lab 08/18/20  0636   PCT <0.05       Recent Labs   Lab 08/18/20  0636 08/19/20  0535 08/20/20  0450 08/21/20  0432   CLAUDIO 751.6* 870.5* 736.7* 680.4*   * 271* 212 201   DDIMER  --  1

## 2020-08-24 NOTE — PROGRESS NOTES
[facility]                INFECTIOUS DISEASE PROGRESS NOTE    Yelena Btaista Patient Status:  Inpatient    3/5/1960 MRN [id number]   [facility] 4SW-A Attending Bruce Mariee, [address] [facility] Day # 6 PCP Maddie Hardwick MD       Sub Range    Blood Culture Result No Growth 1 Day N/A             ASSESSMENT/PLAN:  1.  ETOH withdrawal in icu  2. COVID-19 infection  Symptoms around 8/15  Tested positive 8/17  -stable no 02 requirements, no hypoxia  CXR without infiltrates  Continue isolatio

## 2020-08-24 NOTE — PLAN OF CARE
Assumed pt care at 0730. Pt in bed watching tv. No s/s of acute distress noted at this time. VSS. Pt is alert and oriented X 4. Pt denies any pain at this time.  Pt c/o cough, and that it kept him up all night last night, will medicate per

## 2020-08-24 NOTE — PAYOR COMM NOTE
8/21-8/24 REMAINS IN ICU    CONTINUED STAY REVIEW    Payor: Della Aiken FHP/ICP  Subscriber #:  [id number]  Authorization Number: [id number]    Admit date: 8/18/20  Admit time: [address]    Admitting Physician: Catracho Carson MD  Attending Physician:  Emily Ann Thiamine HCl (Vitamin B-1) tab 100 mg     Date Action Dose Route User    8/24/2020 0826 Given 100 mg Oral Marieol Needs, RN        :8/22 PULMONARY NOTE  S:  Pt continues on precedex gtt.   He is oriented this am, denies complaints  OBJECTIVE:  /79 - trend inflammatory markers, downtrending   3. Hyponatremia    - improving, cont IVF  4. Transaminitis: 2/2 EtOH abuse  -improving  5. Thrombocytopenia: suspect 2/2 EtOH abuse  -monitor   6. FEN - ADAT  7. Ppx - SCD  8.  Dispo  Full code  - ICU, at risk fo                         Lungs: Clear to auscultation bilaterally, no wheezes or crackles                           Chest wall: No tenderness or deformity.                           XBYET: [id number] rate and rhythm, normal S1S2                          Abdomen • Potassium Chloride ER  40 mEq Oral Q4H   • magnesium oxide  400 mg Oral Once   • Pantoprazole Sodium  40 mg Oral QAM AC   • Ocuvite-Lutein  1 tablet Oral Daily   • folic acid  1 mg Oral Daily   • enoxaparin  40 mg Subcutaneous Daily   • aspirin  81 mg Or - trend inflammatory markers, downtrending   18. Alcohol withdrawal with DTs - improved. Off precedex  - CIWA with ativan prn  - thiamine, folic acid, MVI  19. Hyponatremia - levels fluctuating  - cont IVF  20.  Transaminitis: 2/2 EtOH abuse  -improving  21

## 2020-08-24 NOTE — BH LEVEL OF CARE ASSESSMENT
Level of Care Assessment Note    General Questions  Why are you here?: States he has been coughing  Precipitating Events: Patient was placed on the [facility] protocol for his drinking etoh.   History of Present Illness: Patient admits to drinking 3 24 oz of beer Alcohol Use  How often do you have a drink containing alcohol? : 4 or more times per week  Alcohol Use  Age at first use?: teenager  Route: Oral  Average amount used? : 3 - 24 oz of beer daily abuse  Protective Factors: states has a lot to live for    Motivational Stage of Change  Motivational Stage of Change: Pre-Contemplative    Level of Care Recommendations  Consulted with: Dr. Socorro Neal  Level of Care Recommendation: Partial Hospitalization  Part

## 2020-08-25 LAB
ANION GAP SERPL CALC-SCNC: 6 MMOL/L (ref 0–18)
ANION GAP SERPL CALC-SCNC: 7 MMOL/L (ref 0–18)
BUN BLD-MCNC: 5 MG/DL (ref 7–18)
BUN BLD-MCNC: 7 MG/DL (ref 7–18)
BUN/CREAT SERPL: 11.7 (ref 10–20)
BUN/CREAT SERPL: 7.8 (ref 10–20)
CALCIUM BLD-MCNC: 8.8 MG/DL (ref 8.5–10.1)
CALCIUM BLD-MCNC: 9.2 MG/DL (ref 8.5–10.1)
CHLORIDE SERPL-SCNC: 92 MMOL/L (ref 98–112)
CHLORIDE SERPL-SCNC: 92 MMOL/L (ref 98–112)
CO2 SERPL-SCNC: 24 MMOL/L (ref 21–32)
CO2 SERPL-SCNC: 25 MMOL/L (ref 21–32)
CREAT BLD-MCNC: 0.6 MG/DL (ref 0.7–1.3)
CREAT BLD-MCNC: 0.64 MG/DL (ref 0.7–1.3)
DEPRECATED RDW RBC AUTO: 39.1 FL (ref 35.1–46.3)
ERYTHROCYTE [DISTWIDTH] IN BLOOD BY AUTOMATED COUNT: 11.4 % (ref 11–15)
GLUCOSE BLD-MCNC: 103 MG/DL (ref 70–99)
GLUCOSE BLD-MCNC: 80 MG/DL (ref 70–99)
HAV IGM SER QL: 1.8 MG/DL (ref 1.6–2.6)
HCT VFR BLD AUTO: 36.5 % (ref 39–53)
HGB BLD-MCNC: 13 G/DL (ref 13–17.5)
MCH RBC QN AUTO: 33 PG (ref 26–34)
MCHC RBC AUTO-ENTMCNC: 35.6 G/DL (ref 31–37)
MCV RBC AUTO: 92.6 FL (ref 80–100)
OSMOLALITY SERPL CALC.SUM OF ELEC: 252 MOSM/KG (ref 275–295)
OSMOLALITY SERPL CALC.SUM OF ELEC: 254 MOSM/KG (ref 275–295)
OSMOLALITY SERPL: 254 MOSM/KG (ref 280–300)
OSMOLALITY UR: 412 MOSM/KG (ref 300–1300)
PLATELET # BLD AUTO: 206 10(3)UL (ref 150–450)
POTASSIUM SERPL-SCNC: 3.7 MMOL/L (ref 3.5–5.1)
POTASSIUM SERPL-SCNC: 4 MMOL/L (ref 3.5–5.1)
RBC # BLD AUTO: 3.94 X10(6)UL (ref 4.3–5.7)
SODIUM SERPL-SCNC: 123 MMOL/L (ref 136–145)
SODIUM SERPL-SCNC: 123 MMOL/L (ref 136–145)
SODIUM SERPL-SCNC: 63 MMOL/L
WBC # BLD AUTO: 2.8 X10(3) UL (ref 4–11)

## 2020-08-25 RX ORDER — MAGNESIUM OXIDE 400 MG (241.3 MG MAGNESIUM) TABLET
400 TABLET ONCE
Status: COMPLETED | OUTPATIENT
Start: 2020-08-25 | End: 2020-08-25

## 2020-08-25 NOTE — PHYSICAL THERAPY NOTE
PHYSICAL THERAPY TREATMENT NOTE - INPATIENT    Room Number: 454/454-A     Session: 1  Number of Visits to Meet Established Goals: 5    Presenting Problem: ETOH withdrawal, +COVID19     History related to current admission: Pt was admitted from home on 8/1 need...   -   Moving to and from a bed to a chair (including a wheelchair)?: A Little   -   Need to walk in hospital room?: A Little   -   Climbing 3-5 steps with a railing?: A Lot       AM-PAC Score:  Raw Score: 17   Approx Degree of Impairment: 50.57% limitations in independent bed mobility, transfers, and gait. The patient is below baseline and would benefit from skilled inpatient PT to address the above deficits to assist patient in returning to prior to level of function.  Subacute rehab is recommende

## 2020-08-25 NOTE — PROGRESS NOTES
[facility] Hospitalist Team  Progress Note      Tao Carlin  3/5/1960    Assessment/Plan:     #etoh abuse, etoh withdrawal   - per RN no longer actively withdrawing  - psych liaison when able    #Elevated BP, now improved  -hydralazine prn ordered    #Radhaoph --  81 79  --  80       Recent Labs   Lab 08/19/20  0535 08/20/20  0450 08/21/20  0432 08/22/20  0554 08/23/20  0427 08/24/20  0448   * 134* 106* 79* 59 52   * 152* 99* 67* 42* 43*   ALB 3.8 3.5 3.4 3.2* 3.1* 3.1*   * 212 201  --   --

## 2020-08-25 NOTE — CM/SW NOTE
sw received VM from pts ins  stating that pts PCP is Dr Ulices Edwards [phone] [place] [phone].      sw called ins CM and left VM requesting call back with information on in network JAMES facilities and to confirm if pt has a JAMES benefit

## 2020-08-25 NOTE — PROGRESS NOTES
[facility] PULMONARY/CRITICAL CARE    S: Pt is doing well. He has a cough but not too bad.  Denies shortness of breath     Meds:  • magnesium oxide  400 mg Oral Once   • Pantoprazole Sodium  40 mg Oral QAM AC   • Ocuvite-Lutein  1 tablet Oral Daily   • folic acid 42* 43*  --   --    ALT 79*  --  59 52  --   --    BILT 0.6  --  0.4 0.5  --   --    TP 6.9  --  6.6 6.5  --   --     < > = values in this interval not displayed.        Recent Labs   Lab 08/19/20  0535 08/20/20  0450 08/21/20  0432   CLAUDIO 870.5* 736.7* 680

## 2020-08-25 NOTE — CM/SW NOTE
Care Progression Note:  Active Acute Medical Issue:   63 y/o male with COVID-19 infection, currently on room air; stable to transfer out to floor today  Alcohol withdrawal with DTs, improved  Thrombocytopenia, likely due to ETOH'    Other Contributing Medi

## 2020-08-25 NOTE — PROGRESS NOTES
Pt received on room air. Denies chest pain/ shortness of breath.  + nonproductive cough- robitussin prn. Tylenol for headache  Tolerating po intake-  Refused colace this evening. Pt updated on plan of care  Plan for PT in am and possible discharge.

## 2020-08-25 NOTE — PLAN OF CARE
Assumed care at shift change. Patient alert and oriented x3-4, forgetful. Pupils equal and reactive. Generalized weakness. Working with PT. On room air. Nonproductive cough. SR/ST low 100's per tele. BP stable. Pulses palpable. SCDs on. Regular diet.  1 sof

## 2020-08-25 NOTE — CM/SW NOTE
08/25/20 1000   CM/SW Referral Data   Referral Source Social Work (self-referral)   Reason for Referral Discharge planning   Informant Patient   Patient Info   Patient's Mental Status Alert;Oriented   Patient's [address]   Number of Levels

## 2020-08-25 NOTE — PROGRESS NOTES
[facility]                INFECTIOUS DISEASE PROGRESS NOTE    Bharat Bucio Patient Status:  Inpatient    3/5/1960 MRN [id number]   [facility] 4SW-A Attending Belle Radford, [address] [facility] Day # 7 PCP Wilfredo Stone MD       Sub --   --    TP 6.9  --  6.6 6.5  --   --     < > = values in this interval not displayed. No results found for: [address] Encounter on 08/17/20   1.  BLOOD CULTURE     Status: None (Preliminary result)    Collection Time: 08/22/20

## 2020-08-26 ENCOUNTER — APPOINTMENT (OUTPATIENT)
Dept: CT IMAGING | Facility: HOSPITAL | Age: 60
DRG: 178 | End: 2020-08-26
Attending: INTERNAL MEDICINE
Payer: MEDICAID

## 2020-08-26 LAB
AMMONIA PLAS-MCNC: 19 UMOL/L (ref 11–32)
ANION GAP SERPL CALC-SCNC: 9 MMOL/L (ref 0–18)
BILIRUB UR QL STRIP.AUTO: NEGATIVE
BUN BLD-MCNC: 5 MG/DL (ref 7–18)
BUN/CREAT SERPL: 9.3 (ref 10–20)
CALCIUM BLD-MCNC: 8.6 MG/DL (ref 8.5–10.1)
CHLORIDE SERPL-SCNC: 94 MMOL/L (ref 98–112)
CLARITY UR REFRACT.AUTO: CLEAR
CO2 SERPL-SCNC: 25 MMOL/L (ref 21–32)
COLOR UR AUTO: YELLOW
CREAT BLD-MCNC: 0.54 MG/DL (ref 0.7–1.3)
DEPRECATED RDW RBC AUTO: 39.2 FL (ref 35.1–46.3)
ERYTHROCYTE [DISTWIDTH] IN BLOOD BY AUTOMATED COUNT: 11.7 % (ref 11–15)
GLUCOSE BLD-MCNC: 87 MG/DL (ref 70–99)
GLUCOSE UR STRIP.AUTO-MCNC: NEGATIVE MG/DL
HAV IGM SER QL: 1.9 MG/DL (ref 1.6–2.6)
HCT VFR BLD AUTO: 37.5 % (ref 39–53)
HGB BLD-MCNC: 13.5 G/DL (ref 13–17.5)
LEUKOCYTE ESTERASE UR QL STRIP.AUTO: NEGATIVE
MCH RBC QN AUTO: 32.8 PG (ref 26–34)
MCHC RBC AUTO-ENTMCNC: 36 G/DL (ref 31–37)
MCV RBC AUTO: 91.2 FL (ref 80–100)
NITRITE UR QL STRIP.AUTO: NEGATIVE
OSMOLALITY SERPL CALC.SUM OF ELEC: 263 MOSM/KG (ref 275–295)
PH UR STRIP.AUTO: 7 [PH] (ref 4.5–8)
PLATELET # BLD AUTO: 304 10(3)UL (ref 150–450)
POTASSIUM SERPL-SCNC: 3.6 MMOL/L (ref 3.5–5.1)
POTASSIUM SERPL-SCNC: 4.3 MMOL/L (ref 3.5–5.1)
PROT UR STRIP.AUTO-MCNC: NEGATIVE MG/DL
RBC # BLD AUTO: 4.11 X10(6)UL (ref 4.3–5.7)
RBC UR QL AUTO: NEGATIVE
SODIUM SERPL-SCNC: 128 MMOL/L (ref 136–145)
SP GR UR STRIP.AUTO: 1.01 (ref 1–1.03)
UROBILINOGEN UR STRIP.AUTO-MCNC: <2 MG/DL
WBC # BLD AUTO: 3.4 X10(3) UL (ref 4–11)

## 2020-08-26 PROCEDURE — 70470 CT HEAD/BRAIN W/O & W/DYE: CPT | Performed by: INTERNAL MEDICINE

## 2020-08-26 RX ORDER — HALOPERIDOL 5 MG/ML
1 INJECTION INTRAMUSCULAR EVERY 6 HOURS PRN
Status: DISCONTINUED | OUTPATIENT
Start: 2020-08-26 | End: 2020-08-28

## 2020-08-26 RX ORDER — POTASSIUM CHLORIDE 20 MEQ/1
40 TABLET, EXTENDED RELEASE ORAL EVERY 4 HOURS
Status: COMPLETED | OUTPATIENT
Start: 2020-08-26 | End: 2020-08-26

## 2020-08-26 RX ORDER — DEXMEDETOMIDINE HYDROCHLORIDE 4 UG/ML
INJECTION, SOLUTION INTRAVENOUS CONTINUOUS
Status: DISCONTINUED | OUTPATIENT
Start: 2020-08-26 | End: 2020-08-28

## 2020-08-26 NOTE — PROGRESS NOTES
[facility]                INFECTIOUS DISEASE PROGRESS NOTE    Farida Soni Patient Status:  Inpatient    3/5/1960 MRN [id number]   [facility] 4SW-A Attending Pedro Lopez, [address] [facility] [address] Day # 8 PCP Yvon Dior MD       Sub --    K 3.2*   < > 3.8 3.5   < > 4.0 3.7 3.6 4.3   CL 99  --  100 96*  --  92* 92* 94*  --    CO2 26.0  --  26.0 23.0  --  25.0 24.0 25.0  --    ALKPHO 98  --  90 90  --   --   --   --   --    AST 67*  --  42* 43*  --   --   --   --   --    ALT 79*  --  59

## 2020-08-26 NOTE — PROGRESS NOTES
[facility] PULMONARY/CRITICAL CARE    S: Pt is more confused this morning. He says he's waiting for his brother to come in.      Meds:  • Potassium Chloride ER  40 mEq Oral Q4H   • Pantoprazole Sodium  40 mg Oral QAM AC   • Ocuvite-Lutein  1 tablet Oral Daily   • 90 90  --   --   --   --    AST 67*  --  42* 43*  --   --   --   --    ALT 79*  --  59 52  --   --   --   --    BILT 0.6  --  0.4 0.5  --   --   --   --    TP 6.9  --  6.6 6.5  --   --   --   --     < > = values in this interval not displayed.        Recent

## 2020-08-26 NOTE — PROGRESS NOTES
[facility] Hospitalist Team  Progress Note      Rudi Human  3/5/1960    Assessment/Plan:     #etoh abuse, etoh withdrawal   - per RN no longer actively withdrawing  - psych liaison when able    #Elevated BP, now improved  -hydralazine prn ordered    #Radhaoph --   --   --   --   --    GLU 85   < > 76  --  81 79  --  80 103* 87    < > = values in this interval not displayed.        Recent Labs   Lab 08/20/20  0450 08/21/20  0432 08/22/20  0554 08/23/20  0427 08/24/20  0448   * 106* 79* 59 52   * 99

## 2020-08-26 NOTE — PLAN OF CARE
Pt received on room air. + nonproductive cough. Denies shortness of breath  C/o mild headache- prn tylenol given  Pt forgetful, bed alarm on  Robitussin prn  Voiding per urinal  1500 ml fluid restriction in place- pt educated and verbalized understanding.

## 2020-08-26 NOTE — CM/SW NOTE
Still awaiting call from insurance CM. AIDIN referrals started- pending. DON screen requested. sw to follow up with pt if accepting JAMES referrals identified.

## 2020-08-26 NOTE — PROGRESS NOTES
PT very agitated and confused early this afternoon. Posey vest applied. CT of head done and negative UA completed and negative. Dr. Somers Arrant to see pt in the morning. Precedex started. Transfer order canceled.

## 2020-08-26 NOTE — PLAN OF CARE
A/Ox3. Kanwal Owens at times. Tremors noted. On ra and . Tele showing SR. Voids per urinal. SCD's/ Lovenox. Afebrile. VSS. Dry cough noted. Seizure precautions in place. Tolerating diet. Call light within reach with bed alarm on. Will cont to monitor.    Pr discharge learning needs (meds, wound care, etc)  - Arrange for interpreters to assist at discharge as needed  - Consider post-discharge preferences of patient/family/discharge partner  - Complete POLST form as appropriate  - Assess patient's ability to be

## 2020-08-26 NOTE — PAYOR COMM NOTE
--------------  CONTINUED STAY REVIEW    Payor: [facility]  Subscriber #:  [id number]  Authorization Number: [id number]    Admit date: 8/18/20  Admit time: 0157    Admitting Physician: Mike Morgan MD  Attending Physician:  Isabella Crane Sweetie Lemus, RN      Thiamine HCl (Vitamin B-1) tab 100 mg     Date Action Dose Route User    8/26/2020 7710 Given 100 mg Oral Sukumar Quevedo RN          8/25 HOSPITALIST NOTE  Assessment/Plan:      #etoh abuse, etoh withdrawal   - per RN no longer acti 0.64*   CA 9.3 9.0 8.7 8.7  --  8.2* 8.4*  --  8.8   MG 1.9 2.1  --  1.8  --  1.8 1.6  --  1.8   PHOS 3.9 3.1  --  2.9  --   --   --   --   --    GLU 76 85 76 76  --  81 79  --  80                  Recent Labs   Lab 08/19/20  0535 08/20/20  0450 08/21/20 ondansetron HCl, PEG 3350, magnesium hydroxide, bisacodyl, Fleet Enema, Metoclopramide HCl, hydrALAzine HCl, sodium chloride 0.9%        OBJECTIVE:    08/25/20  2356 08/26/20  0000 08/26/20  0400 08/26/20  0423   BP:   139/87 130/78     Pulse:   92 81   Test + 8/17  - no indication for dexamethasone unless he becomes hypoxemic  2. Alcohol withdrawal with DTs - improved. Off precedex  - CIWA with ativan prn  - thiamine, folic acid, MVI  3.  Altered mental status - possibly due to hyponatremia  -check fritz

## 2020-08-27 LAB
ANION GAP SERPL CALC-SCNC: 4 MMOL/L (ref 0–18)
BUN BLD-MCNC: 6 MG/DL (ref 7–18)
BUN/CREAT SERPL: 9.4 (ref 10–20)
CALCIUM BLD-MCNC: 9.9 MG/DL (ref 8.5–10.1)
CHLORIDE SERPL-SCNC: 101 MMOL/L (ref 98–112)
CO2 SERPL-SCNC: 28 MMOL/L (ref 21–32)
CREAT BLD-MCNC: 0.64 MG/DL (ref 0.7–1.3)
DEPRECATED RDW RBC AUTO: 42.3 FL (ref 35.1–46.3)
ERYTHROCYTE [DISTWIDTH] IN BLOOD BY AUTOMATED COUNT: 11.9 % (ref 11–15)
GLUCOSE BLD-MCNC: 100 MG/DL (ref 70–99)
HCT VFR BLD AUTO: 43.2 % (ref 39–53)
HGB BLD-MCNC: 14.7 G/DL (ref 13–17.5)
MCH RBC QN AUTO: 32.5 PG (ref 26–34)
MCHC RBC AUTO-ENTMCNC: 34 G/DL (ref 31–37)
MCV RBC AUTO: 95.6 FL (ref 80–100)
OSMOLALITY SERPL CALC.SUM OF ELEC: 274 MOSM/KG (ref 275–295)
PLATELET # BLD AUTO: 425 10(3)UL (ref 150–450)
POTASSIUM SERPL-SCNC: 4.1 MMOL/L (ref 3.5–5.1)
RBC # BLD AUTO: 4.52 X10(6)UL (ref 4.3–5.7)
SODIUM SERPL-SCNC: 133 MMOL/L (ref 136–145)
WBC # BLD AUTO: 4.3 X10(3) UL (ref 4–11)

## 2020-08-27 PROCEDURE — 90792 PSYCH DIAG EVAL W/MED SRVCS: CPT | Performed by: OTHER

## 2020-08-27 NOTE — PROGRESS NOTES
PSYCH CONSULT    Date of Admission: 8/17/20  Date of Consult: 8/27/20  Reason for Consultation: Altered mental status, agitation     Impression:  Primary Psychiatric Diagnosis:  Acute delirium/encephalopathy due to severe alcohol withdrawal/delirium tremen

## 2020-08-27 NOTE — PLAN OF CARE
Received pt alert, confused, able to follow commands. Precedex per [facility] for anxiety. Oliver vest in place for patient safety. On room air with diminished breath sounds. Afebrile. Blood pressure stable. Normal sinus rhythm. SCD's in place for DVT prophylaxis.

## 2020-08-27 NOTE — PROGRESS NOTES
[facility]UL[facility]ICAL CARE    S: Pt has been confused yesterday/last night, was restarted on precedex. He's better this morning, says I woke him up from a dream. He knows he's at [facility] and he knows the year.  Denies shortness of breath, cough, pa CREATSERUM 0.68*  --  0.63* 0.51*   < > 0.60* 0.54*  --  0.64*   GLU 76  --  81 79   < > 103* 87  --  100*   ALB 3.2*  --  3.1* 3.1*  --   --   --   --   --    ALKPHO 98  --  90 90  --   --   --   --   --    AST 67*  --  42* 43*  --   --   --   --   --

## 2020-08-27 NOTE — CONSULTS
[facility]  Report of Psychiatric Consultation    Maria Isabel Floyd Patient Status:  Inpatient    3/5/1960 MRN [id number]   [facility] 4SW-A Attending Lynnette Lepe MD   Hosp Day # 10 PCP Lali Carrillo MD     Date of Admission:  being the highest) and is only open to Adarsh Robb. He denies having any hx of anxiety or depression. He denies depressed mood, hopelessness, anhedonia, decreased motivation, or suicidal ideation.  He talked about his 2nd wife who he was  to for 24 y injection 1 mg, 1 mg, Intravenous, Q1H PRN  •  LORazepam (ATIVAN) injection 2 mg, 2 mg, Intravenous, Q30 Min PRN  •  LORazepam (ATIVAN) injection 3 mg, 3 mg, Intravenous, Q30 Min PRN  •  LORazepam (ATIVAN) injection 4 mg, 4 mg, Intravenous, Q15 Min PRN  • and Concentration:   fair  Memory:  intact remote  Language: Intact naming and repetition  Fund of Knowledge: Able to recite name of [place] president    Insight: fair  Judgment: fair    Laboratory Data:  Lab Results   Component Value Date    WBC 4.3 08/27/2020

## 2020-08-27 NOTE — PLAN OF CARE
A/Ox3. Disoriented to situation. Denies pain. Confused at times. Precedex gtt titrated off. pt at this time is calm and cooperative. Follows all commands. Worked with PT- up 1 and a walker today. [place]uyer vest taken off. Fall precautions in place. On ra.  Te discharge planning  - Arrange for needed discharge resources and transportation as appropriate  - Identify discharge learning needs (meds, wound care, etc)  - Arrange for interpreters to assist at discharge as needed  - Consider post-discharge preferences

## 2020-08-27 NOTE — PROGRESS NOTES
[facility] Hospitalist Team  Progress Note      Eber Pierre  3/5/1960    Assessment/Plan:     #etoh abuse, etoh withdrawal   - per RN no longer actively withdrawing  - psych following, not currently agitated, may transfer to behavior med psych floor    #Aishwarya 9.9   MG 1.8  --  1.8 1.6  --  1.8  --  1.9  --   --    PHOS 2.9  --   --   --   --   --   --   --   --   --    GLU 76  --  81 79  --  80 103* 87  --  100*    < > = values in this interval not displayed.        Recent Labs   Lab 08/21/20  1348 08/22/20  056

## 2020-08-27 NOTE — CM/SW NOTE
JAMES referral still pending; only one accepting facility at this time: [facility] to follow up with pt once final facility list available.      Addendum:  sw notified by [facility] that they can accept once pt is restraint free 24 hrs

## 2020-08-27 NOTE — CM/SW NOTE
sw later notified that [facility] cannot accept due to bed availability. Pt only accepted at [facility]. sw spoke to pt and he is agreeable to go to [facility]. sw explained that we will still need ins auth. Pt verbalized understanding.

## 2020-08-27 NOTE — PROGRESS NOTES
[facility]                INFECTIOUS DISEASE PROGRESS NOTE    Ari Mir Patient Status:  Inpatient    3/5/1960 MRN [id number]   [facility] 4SW-A Attending Peggy Fox, [address] Day # 9 PCP Vladimir Gallegos MD       Sub CL 99  --  100 96*   < > 92* 94*  --  101   CO2 26.0  --  26.0 23.0   < > 24.0 25.0  --  28.0   ALKPHO 98  --  90 90  --   --   --   --   --    AST 67*  --  42* 43*  --   --   --   --   --    ALT 79*  --  59 52  --   --   --   --   --    BILT 0.6  --  0.

## 2020-08-27 NOTE — PHYSICAL THERAPY NOTE
PHYSICAL THERAPY TREATMENT NOTE - INPATIENT    Room Number: 454/454-A     Session: 2  Number of Visits to Meet Established Goals: 5    Presenting Problem: ETOH withdrawal, +COVID19     History related to current admission: Pt was admitted from home on 8/1 chair (including a wheelchair)?: A Little   -   Need to walk in hospital room?: A Little   -   Climbing 3-5 steps with a railing?: A Lot       AM-PAC Score:  Raw Score: 17   Approx Degree of Impairment: 50.57%   Standardized Score (AM-PAC Scale): 42.13   C cognitive impairments. These impairments and comorbidities manifest themselves as functional limitations in independent bed mobility, transfers, and gait.  The patient is below baseline and would benefit from skilled inpatient PT to address the above deficit

## 2020-08-28 VITALS
BODY MASS INDEX: 19.51 KG/M2 | HEIGHT: 70 IN | TEMPERATURE: 99 F | RESPIRATION RATE: 18 BRPM | DIASTOLIC BLOOD PRESSURE: 73 MMHG | OXYGEN SATURATION: 98 % | SYSTOLIC BLOOD PRESSURE: 127 MMHG | WEIGHT: 136.25 LBS | HEART RATE: 73 BPM

## 2020-08-28 LAB
ANION GAP SERPL CALC-SCNC: 8 MMOL/L (ref 0–18)
BUN BLD-MCNC: 8 MG/DL (ref 7–18)
BUN/CREAT SERPL: 13.3 (ref 10–20)
CALCIUM BLD-MCNC: 9.3 MG/DL (ref 8.5–10.1)
CHLORIDE SERPL-SCNC: 101 MMOL/L (ref 98–112)
CO2 SERPL-SCNC: 23 MMOL/L (ref 21–32)
CREAT BLD-MCNC: 0.6 MG/DL (ref 0.7–1.3)
DEPRECATED RDW RBC AUTO: 42.8 FL (ref 35.1–46.3)
ERYTHROCYTE [DISTWIDTH] IN BLOOD BY AUTOMATED COUNT: 12 % (ref 11–15)
GLUCOSE BLD-MCNC: 86 MG/DL (ref 70–99)
HCT VFR BLD AUTO: 39.8 % (ref 39–53)
HGB BLD-MCNC: 13.5 G/DL (ref 13–17.5)
MCH RBC QN AUTO: 32.7 PG (ref 26–34)
MCHC RBC AUTO-ENTMCNC: 33.9 G/DL (ref 31–37)
MCV RBC AUTO: 96.4 FL (ref 80–100)
OSMOLALITY SERPL CALC.SUM OF ELEC: 272 MOSM/KG (ref 275–295)
PLATELET # BLD AUTO: 495 10(3)UL (ref 150–450)
POTASSIUM SERPL-SCNC: 3.5 MMOL/L (ref 3.5–5.1)
RBC # BLD AUTO: 4.13 X10(6)UL (ref 4.3–5.7)
SODIUM SERPL-SCNC: 132 MMOL/L (ref 136–145)
WBC # BLD AUTO: 5.5 X10(3) UL (ref 4–11)

## 2020-08-28 RX ORDER — POTASSIUM CHLORIDE 20 MEQ/1
40 TABLET, EXTENDED RELEASE ORAL EVERY 4 HOURS
Status: COMPLETED | OUTPATIENT
Start: 2020-08-28 | End: 2020-08-28

## 2020-08-28 NOTE — CM/SW NOTE
Final dc orders received. bran confirmed bed at [facility]. sw arranged [facility] for 1130am. PCS form completed and [facility].      RN to call report to  [phone]

## 2020-08-28 NOTE — PAYOR COMM NOTE
--------------  DISCHARGE REVIEW    Payor: Rashaun Chow [facility]/ICP  Subscriber #:  [id number]  Authorization Number: [id number]    Admit date: 8/18/20  Admit time:  0157  Discharge Date: 8/28/2020 12:18 PM     Admitting Physician: Emory Avila MD  Attendi

## 2020-08-28 NOTE — PLAN OF CARE
Assumed care of pt who is talkative, alert & oriented. Sometimes repetitive in his stories. Up to bathroom, SB assist. Shaky & slightly unsteady. Planned transfer to rehab at 11:30. Up in chair.

## 2020-08-28 NOTE — PLAN OF CARE
Patient resting comfortably, alert but forgetful. Conversant, aware of safety precautions, acknowledges need to call for assistance. No episodes of confusion, combativeness, or attempts to interfere with care.

## 2020-09-01 LAB — VITAMIN B1 (THIAMINE), WHOLE B: 139 NMOL/L

## 2020-09-01 NOTE — DISCHARGE SUMMARY
[facility] Discharge Summary    Patient ID:  Beronica Winston  [id number]  62year old  3/5/1960    Admit date: 8/17/2020  Discharge date and time: 8/28/20  Attending Physician: Yadi Gimenez MD  Primary Care Physician: MD APRIL Billings of discharge:  Gen: No acute distress, alert and oriented  CV: RRR, +s1/s2  Lungs: CTAB, good respiratory effort  Abdomen: s/nt/nd  Ext: Moves all 4 extremities, no c/c/e  Neuro: CN Intact, no focal deficits    Discharge meds     Medication List      JEN Chuyita Howell MD on 8/26/2020 at 4:13 PM       Ct Angiography, Chest (cpt=71275)    Result Date: 8/17/2020  PROCEDURE:  CT ANGIOGRAPHY, CHEST (CPT=71275)  COMPARISON:  None.   INDICATIONS:  difficulty breathing  TECHNIQUE:  IV contrast-enhanced multisl 8/17/2020 at 11:08 PM     Finalized by (CST): Giancarlo De La Cruz MD on 8/17/2020 at 11:11 PM       Xr Chest Ap Portable  (cpt=71045)    Result Date: 8/23/2020            PROCEDURE:  XR CHEST AP PORTABLE  (CPT=71045)  TECHNIQUE:  AP chest radiograph was obtaine

## 2021-02-02 PROCEDURE — 93010 ELECTROCARDIOGRAM REPORT: CPT | Performed by: INTERNAL MEDICINE

## 2021-02-03 ENCOUNTER — EXTERNAL RECORD (OUTPATIENT)
Dept: OTHER | Age: 61
End: 2021-02-03

## 2021-02-03 LAB
CHOLESTEROL HDL RATIO: 1.8
CHOLESTEROL: 175 MG/DL
ESTIMATED AVERAGE GLUCOSE: 103 MG/DL
HDL CHOLESTEROL: 100 MG/DL
HEMOGLOBIN A1C (GLYCOSYLATED): 5.2 %
INTERNATIONAL NORMAL: 1 (ref 0.8–1.1)
LDL CHOLESTEROL DIRECT: 70 MG/DL (ref 0–129)
MG (MAGNESIUM, SERUM: 1.9 MG/DL (ref 1.6–2.6)
NON HDL CHOLESTEROL: 75 MG/DL
PROTHROMBIN TIME (PATIENT): 11.5 SECONDS (ref 10.1–13.1)
TRIGLYCERIDES: 25 MG/DL
VLDL CHOLESTEROL: 5 MG/DL (ref 5–30)

## 2021-02-03 PROCEDURE — 93010 ELECTROCARDIOGRAM REPORT: CPT | Performed by: INTERNAL MEDICINE

## 2021-02-03 PROCEDURE — 99223 1ST HOSP IP/OBS HIGH 75: CPT | Performed by: HOSPITALIST

## 2021-02-04 LAB
*MEAN CORPUSCULAR HGB CONC: 33.5 G/DL (ref 32.5–35.8)
ANION GAP: 12 MEQ/L (ref 6.2–14.7)
BASOPHIL AUTOMATED: 0.8 %
BASOPHILS: 0 (ref 0–0.14)
BLOOD UREA NITROGEN (BUN): 8 MG/DL (ref 7–25)
BUN/CREATININE RATIO: 13.6 (ref 7.4–23)
CALCIUM, SERUM: 9.6 MG/DL (ref 8.6–10.3)
CARBON DIOXIDE: 26 MEQ/L (ref 21–31)
CHLORIDE, SERUM: 97 MMOL/L (ref 98–107)
CREATININE: 0.59 MG/DL (ref 0.7–1.3)
EOSINOPHIL AUTOMATED: 0.7 %
EOSINOPHILS: 0 (ref 0–0.6)
EST GLOMERULAR FILTRATION RATE: > 60 ML/MIN
GLUCOSE: 91 MG/DL (ref 70–99)
HEMATOCRIT: 39.8 % (ref 38.6–49.2)
HEMOGLOBIN: 13.3 GM/DL (ref 13–17)
K (POTASSIUM, SERUM): 3.4 MMOL/L (ref 3.5–5.2)
LYMPHOCYTE AUTOMATED: 7.7 %
LYMPHOCYTES: 0.4 (ref 0.78–3.73)
MEAN CORPUSCULAR HGB: 33.7 PG (ref 26–34)
MEAN CORPUSCULAR VOL: 100.7 FL (ref 80–100)
MEAN PLATELET VOLUME: 7.1 FL (ref 6.8–10.2)
MG (MAGNESIUM, SERUM: 1.9 MG/DL (ref 1.6–2.6)
MONOCYTE AUTOMATED: 11.2 %
MONOCYTES: 0.6 (ref 0.17–1)
NA (SODIUM, SERUM): 135 MMOL/L (ref 133–144)
NEUTROPHIL ABSOLUTE: 4.3 (ref 1.91–7.6)
NEUTROPHIL AUTOMATED: 79.6 %
PLATELET COUNT: 116 K/MM3 (ref 150–450)
RED BLOOD CELL COUNT: 3.96 M/MM3 (ref 4.34–5.6)
RED CELL DISTRIBUTIO: 13.3 % (ref 11.9–15.9)
WHITE BLOOD CELL COU: 5.4 K/MM3 (ref 4–11)

## 2021-02-04 PROCEDURE — 99232 SBSQ HOSP IP/OBS MODERATE 35: CPT | Performed by: HOSPITALIST

## 2021-02-05 LAB
*MEAN CORPUSCULAR HGB CONC: 33.6 G/DL (ref 32.5–35.8)
A/G RATIO: 1.34 (ref 1.1–2.4)
ALANINE AMINOTRANSFE: 245 U/L (ref 7–52)
ALBUMIN, SERUM (ALB): 4.3 G/DL (ref 3.5–5.7)
ALKALINE PHOSPHATASE (ALK): 92 U/L (ref 34–104)
ANION GAP: 13 MEQ/L (ref 6.2–14.7)
ASPARTATE AMINOTRANS: 336 U/L (ref 13–39)
BASOPHIL AUTOMATED: 0.4 %
BASOPHILS: 0 (ref 0–0.14)
BILIRUBIN, TOTAL: 1.8 MG/DL (ref 0.2–0.8)
BLOOD UREA NITROGEN (BUN): 7 MG/DL (ref 7–25)
BUN/CREATININE RATIO: 11.3 (ref 7.4–23)
CALCIUM, SERUM: 9.5 MG/DL (ref 8.6–10.3)
CARBON DIOXIDE: 20 MEQ/L (ref 21–31)
CHLORIDE, SERUM: 99 MMOL/L (ref 98–107)
CREATININE: 0.62 MG/DL (ref 0.7–1.3)
EOSINOPHIL AUTOMATED: 1.9 %
EOSINOPHILS: 0.1 (ref 0–0.6)
EST GLOMERULAR FILTRATION RATE: > 60 ML/MIN
GLUCOSE: 84 MG/DL (ref 70–99)
HEMATOCRIT: 38.7 % (ref 38.6–49.2)
HEMOGLOBIN: 13 GM/DL (ref 13–17)
K (POTASSIUM, SERUM): 4.2 MMOL/L (ref 3.5–5.2)
LYMPHOCYTE AUTOMATED: 14.1 %
LYMPHOCYTES: 0.7 (ref 0.78–3.73)
MEAN CORPUSCULAR HGB: 33.3 PG (ref 26–34)
MEAN CORPUSCULAR VOL: 99.3 FL (ref 80–100)
MEAN PLATELET VOLUME: 6.6 FL (ref 6.8–10.2)
MG (MAGNESIUM, SERUM: 2.1 MG/DL (ref 1.6–2.6)
MONOCYTE AUTOMATED: 11.3 %
MONOCYTES: 0.6 (ref 0.17–1)
NA (SODIUM, SERUM): 132 MMOL/L (ref 133–144)
NEUTROPHIL ABSOLUTE: 3.7 (ref 1.91–7.6)
NEUTROPHIL AUTOMATED: 72.3 %
PLATELET COUNT: 139 K/MM3 (ref 150–450)
PROTEIN TOTAL: 7.5 G/DL (ref 6.4–8.9)
RED BLOOD CELL COUNT: 3.89 M/MM3 (ref 4.34–5.6)
RED CELL DISTRIBUTIO: 12.9 % (ref 11.9–15.9)
WHITE BLOOD CELL COU: 5.1 K/MM3 (ref 4–11)

## 2021-02-05 PROCEDURE — 99232 SBSQ HOSP IP/OBS MODERATE 35: CPT | Performed by: HOSPITALIST

## 2021-02-06 LAB
*MEAN CORPUSCULAR HGB CONC: 33.2 G/DL (ref 32.5–35.8)
A/G RATIO: 1.41 (ref 1.1–2.4)
ALANINE AMINOTRANSFE: 174 U/L (ref 7–52)
ALBUMIN, SERUM (ALB): 4.1 G/DL (ref 3.5–5.7)
ALKALINE PHOSPHATASE (ALK): 81 U/L (ref 34–104)
ANION GAP: 10 MEQ/L (ref 6.2–14.7)
ASPARTATE AMINOTRANS: 154 U/L (ref 13–39)
BASOPHIL AUTOMATED: 0.9 %
BASOPHILS: 0 (ref 0–0.14)
BILIRUBIN, TOTAL: 1.2 MG/DL (ref 0.2–0.8)
BLOOD UREA NITROGEN (BUN): 10 MG/DL (ref 7–25)
BUN/CREATININE RATIO: 16.7 (ref 7.4–23)
CALCIUM, SERUM: 9.4 MG/DL (ref 8.6–10.3)
CARBON DIOXIDE: 23 MEQ/L (ref 21–31)
CHLORIDE, SERUM: 100 MMOL/L (ref 98–107)
CREATININE: 0.6 MG/DL (ref 0.7–1.3)
EOSINOPHIL AUTOMATED: 3.4 %
EOSINOPHILS: 0.1 (ref 0–0.6)
EST GLOMERULAR FILTRATION RATE: > 60 ML/MIN
GLUCOSE: 107 MG/DL (ref 70–99)
HEMATOCRIT: 39.3 % (ref 38.6–49.2)
HEMOGLOBIN: 13 GM/DL (ref 13–17)
K (POTASSIUM, SERUM): 3.5 MMOL/L (ref 3.5–5.2)
LYMPHOCYTE AUTOMATED: 18.8 %
LYMPHOCYTES: 0.8 (ref 0.78–3.73)
MEAN CORPUSCULAR HGB: 33.5 PG (ref 26–34)
MEAN CORPUSCULAR VOL: 100.8 FL (ref 80–100)
MEAN PLATELET VOLUME: 6.9 FL (ref 6.8–10.2)
MG (MAGNESIUM, SERUM: 1.8 MG/DL (ref 1.6–2.6)
MONOCYTE AUTOMATED: 13.2 %
MONOCYTES: 0.6 (ref 0.17–1)
NA (SODIUM, SERUM): 133 MMOL/L (ref 133–144)
NEUTROPHIL ABSOLUTE: 2.8 (ref 1.91–7.6)
NEUTROPHIL AUTOMATED: 63.7 %
PLATELET COUNT: 181 K/MM3 (ref 150–450)
PROTEIN TOTAL: 7 G/DL (ref 6.4–8.9)
RED BLOOD CELL COUNT: 3.89 M/MM3 (ref 4.34–5.6)
RED CELL DISTRIBUTIO: 13.6 % (ref 11.9–15.9)
WHITE BLOOD CELL COU: 4.3 K/MM3 (ref 4–11)

## 2021-02-06 PROCEDURE — 99232 SBSQ HOSP IP/OBS MODERATE 35: CPT | Performed by: HOSPITALIST

## 2021-02-07 LAB
*MEAN CORPUSCULAR HGB CONC: 33.3 G/DL (ref 32.5–35.8)
A/G RATIO: 1.54 (ref 1.1–2.4)
ALANINE AMINOTRANSFE: 120 U/L (ref 7–52)
ALBUMIN, SERUM (ALB): 4 G/DL (ref 3.5–5.7)
ALKALINE PHOSPHATASE (ALK): 68 U/L (ref 34–104)
ANION GAP: 8 MEQ/L (ref 6.2–14.7)
ASPARTATE AMINOTRANS: 74 U/L (ref 13–39)
BASOPHIL AUTOMATED: 1.3 %
BASOPHILS: 0 (ref 0–0.14)
BILIRUBIN, TOTAL: 1 MG/DL (ref 0.2–0.8)
BLOOD UREA NITROGEN (BUN): 7 MG/DL (ref 7–25)
BUN/CREATININE RATIO: 11.3 (ref 7.4–23)
CALCIUM, SERUM: 9.4 MG/DL (ref 8.6–10.3)
CARBON DIOXIDE: 26 MEQ/L (ref 21–31)
CHLORIDE, SERUM: 102 MMOL/L (ref 98–107)
CREATININE: 0.62 MG/DL (ref 0.7–1.3)
EOSINOPHIL AUTOMATED: 3.3 %
EOSINOPHILS: 0.1 (ref 0–0.6)
EST GLOMERULAR FILTRATION RATE: > 60 ML/MIN
GLUCOSE: 101 MG/DL (ref 70–99)
HEMATOCRIT: 38.3 % (ref 38.6–49.2)
HEMOGLOBIN: 12.8 GM/DL (ref 13–17)
K (POTASSIUM, SERUM): 3.5 MMOL/L (ref 3.5–5.2)
LYMPHOCYTE AUTOMATED: 21.2 %
LYMPHOCYTES: 0.8 (ref 0.78–3.73)
MEAN CORPUSCULAR HGB: 34 PG (ref 26–34)
MEAN CORPUSCULAR VOL: 101.9 FL (ref 80–100)
MEAN PLATELET VOLUME: 6.9 FL (ref 6.8–10.2)
MG (MAGNESIUM, SERUM: 1.7 MG/DL (ref 1.6–2.6)
MONOCYTE AUTOMATED: 16.6 %
MONOCYTES: 0.6 (ref 0.17–1)
NA (SODIUM, SERUM): 136 MMOL/L (ref 133–144)
NEUTROPHIL ABSOLUTE: 2.2 (ref 1.91–7.6)
NEUTROPHIL AUTOMATED: 57.6 %
PLATELET COUNT: 198 K/MM3 (ref 150–450)
PROTEIN TOTAL: 6.6 G/DL (ref 6.4–8.9)
RED BLOOD CELL COUNT: 3.76 M/MM3 (ref 4.34–5.6)
RED CELL DISTRIBUTIO: 13.2 % (ref 11.9–15.9)
WHITE BLOOD CELL COU: 3.8 K/MM3 (ref 4–11)

## 2021-02-07 PROCEDURE — 99232 SBSQ HOSP IP/OBS MODERATE 35: CPT | Performed by: HOSPITALIST

## 2021-02-08 ENCOUNTER — EXTERNAL RECORD (OUTPATIENT)
Dept: OTHER | Age: 61
End: 2021-02-08

## 2021-02-08 ENCOUNTER — TELEPHONE (OUTPATIENT)
Dept: SCHEDULING | Age: 61
End: 2021-02-08

## 2021-02-08 LAB
*MEAN CORPUSCULAR HGB CONC: 33.6 G/DL (ref 32.5–35.8)
A/G RATIO: 1.56 (ref 1.1–2.4)
ALANINE AMINOTRANSFE: 94 U/L (ref 7–52)
ALBUMIN, SERUM (ALB): 3.9 G/DL (ref 3.5–5.7)
ALKALINE PHOSPHATASE (ALK): 77 U/L (ref 34–104)
ANION GAP: 8 MEQ/L (ref 6.2–14.7)
ASPARTATE AMINOTRANS: 50 U/L (ref 13–39)
BASOPHIL AUTOMATED: 1.2 %
BASOPHILS: 0 (ref 0–0.14)
BILIRUBIN, TOTAL: 0.6 MG/DL (ref 0.2–0.8)
BLOOD UREA NITROGEN (BUN): 8 MG/DL (ref 7–25)
BUN/CREATININE RATIO: 11.6 (ref 7.4–23)
CALCIUM, SERUM: 9.3 MG/DL (ref 8.6–10.3)
CARBON DIOXIDE: 27 MEQ/L (ref 21–31)
CHLORIDE, SERUM: 102 MMOL/L (ref 98–107)
COVID-19 RAPID RESULT: NEGATIVE
COVID-19 RAPID SOURCE: NORMAL
CREATININE: 0.69 MG/DL (ref 0.7–1.3)
EOSINOPHIL AUTOMATED: 3.4 %
EOSINOPHILS: 0.1 (ref 0–0.6)
EST GLOMERULAR FILTRATION RATE: > 60 ML/MIN
GLUCOSE: 103 MG/DL (ref 70–99)
HEMATOCRIT: 35 % (ref 38.6–49.2)
HEMOGLOBIN: 11.8 GM/DL (ref 13–17)
K (POTASSIUM, SERUM): 3.4 MMOL/L (ref 3.5–5.2)
LYMPHOCYTE AUTOMATED: 19.6 %
LYMPHOCYTES: 0.7 (ref 0.78–3.73)
MEAN CORPUSCULAR HGB: 34.1 PG (ref 26–34)
MEAN CORPUSCULAR VOL: 101.4 FL (ref 80–100)
MEAN PLATELET VOLUME: 6.9 FL (ref 6.8–10.2)
MG (MAGNESIUM, SERUM: 1.8 MG/DL (ref 1.6–2.6)
MONOCYTE AUTOMATED: 18.2 %
MONOCYTES: 0.7 (ref 0.17–1)
NA (SODIUM, SERUM): 137 MMOL/L (ref 133–144)
NEUTROPHIL ABSOLUTE: 2.1 (ref 1.91–7.6)
NEUTROPHIL AUTOMATED: 57.6 %
PLATELET COUNT: 227 K/MM3 (ref 150–450)
PROTEIN TOTAL: 6.4 G/DL (ref 6.4–8.9)
RED BLOOD CELL COUNT: 3.45 M/MM3 (ref 4.34–5.6)
RED CELL DISTRIBUTIO: 13 % (ref 11.9–15.9)
WHITE BLOOD CELL COU: 3.7 K/MM3 (ref 4–11)

## 2021-02-08 PROCEDURE — 93018 CV STRESS TEST I&R ONLY: CPT | Performed by: INTERNAL MEDICINE

## 2021-02-08 PROCEDURE — 93016 CV STRESS TEST SUPVJ ONLY: CPT | Performed by: INTERNAL MEDICINE

## 2021-02-08 PROCEDURE — 99239 HOSP IP/OBS DSCHRG MGMT >30: CPT | Performed by: HOSPITALIST

## 2021-02-08 PROCEDURE — 78452 HT MUSCLE IMAGE SPECT MULT: CPT | Performed by: INTERNAL MEDICINE

## 2021-07-09 ENCOUNTER — EXTERNAL RECORD (OUTPATIENT)
Dept: OTHER | Age: 61
End: 2021-07-09

## 2021-07-10 LAB
APPEARANCE: CLEAR
BILIRUBIN: NORMAL
COLOR: YELLOW
CREATININE, RANDOM URINE: 48.9 MG/DL (ref 16–241)
GLUCOSE, URINE, AUTOMATED: NORMAL MG/DL
KETONES, URINE, AUTOMATED: NORMAL MG/DL
LEUKOCYTE, URINE, AUTOMATED: NEGATIVE
NITRITE, URINE AUTOMATED: NEGATIVE
OSMOLALITY, RANDOM URINE: 249 MOSM/KG (ref 50–1200)
OSMOLALITY, SERUM: 265 UOSM/KG (ref 273–309)
PH, URINE: 7 (ref 5–8)
PROTEIN, URINE: NORMAL MG/DL
SODIUM, RANDOM URINE: 49.8 MEQ/L (ref 40–220)
SPECIFIC GRAVITY UA: 1.01 (ref 1–1.03)
URINE, BLOOD, AUTOMATED: NORMAL
UROBILINOGEN, URINE, AUTOMATED: NORMAL MG/DL

## 2021-07-11 ENCOUNTER — EXTERNAL RECORD (OUTPATIENT)
Dept: NEPHROLOGY | Age: 61
End: 2021-07-11

## 2021-07-11 LAB
*MEAN CORPUSCULAR HGB CONC: 35.7 G/DL (ref 32.5–35.8)
A/G RATIO: 1.33 (ref 1.1–2.4)
A/G RATIO: 1.33 (ref 1.1–2.4)
ACETAMINOPHEN LEVEL: < 10 UG/ML (ref 10–30)
ALANINE AMINOTRANSFE: 117 U/L (ref 7–52)
ALANINE AMINOTRANSFE: 118 U/L (ref 7–52)
ALBUMIN, SERUM (ALB): 4 G/DL (ref 3.5–5.7)
ALBUMIN, SERUM (ALB): 4 G/DL (ref 3.5–5.7)
ALKALINE PHOSPHATASE (ALK): 95 U/L (ref 34–104)
ALKALINE PHOSPHATASE (ALK): 96 U/L (ref 34–104)
ANION GAP: 8 MEQ/L (ref 6.2–14.7)
ANION GAP: 9 MEQ/L (ref 6.2–14.7)
ASPARTATE AMINOTRANS: 123 U/L (ref 13–39)
ASPARTATE AMINOTRANS: 124 U/L (ref 13–39)
BASOPHIL AUTOMATED: 0.7 %
BASOPHILS: 0 (ref 0–0.14)
BILIRUBIN, DIRECT (CONJUGATED): 0.33 MG/DL (ref 0–0.2)
BILIRUBIN, TOTAL: 1.3 MG/DL (ref 0.2–0.8)
BILIRUBIN, TOTAL: 1.3 MG/DL (ref 0.2–0.8)
BLOOD UREA NITROGEN (BUN): 10 MG/DL (ref 7–25)
BLOOD UREA NITROGEN (BUN): 8 MG/DL (ref 7–25)
BUN/CREATININE RATIO: 13.3 (ref 7.4–23)
BUN/CREATININE RATIO: 13.7 (ref 7.4–23)
CALCIUM, SERUM: 9.3 MG/DL (ref 8.6–10.3)
CALCIUM, SERUM: 9.3 MG/DL (ref 8.6–10.3)
CARBON DIOXIDE: 23 MEQ/L (ref 21–31)
CARBON DIOXIDE: 23 MEQ/L (ref 21–31)
CHLORIDE, SERUM: 94 MMOL/L (ref 98–107)
CHLORIDE, SERUM: 96 MMOL/L (ref 98–107)
CREATININE, RANDOM URINE: 80.5 MG/DL (ref 16–241)
CREATININE: 0.6 MG/DL (ref 0.7–1.3)
CREATININE: 0.73 MG/DL (ref 0.7–1.3)
EOSINOPHIL AUTOMATED: 1.2 %
EOSINOPHILS: 0.1 (ref 0–0.6)
EST GLOMERULAR FILTRATION RATE: > 60 ML/MIN
EST GLOMERULAR FILTRATION RATE: > 60 ML/MIN
GLUCOSE: 116 MG/DL (ref 70–99)
GLUCOSE: 86 MG/DL (ref 70–99)
HEMATOCRIT: 37.1 % (ref 38.6–49.2)
HEMOGLOBIN: 13.3 GM/DL (ref 13–17)
K (POTASSIUM, SERUM): 3.7 MMOL/L (ref 3.5–5.2)
K (POTASSIUM, SERUM): 4.3 MMOL/L (ref 3.5–5.2)
LYMPHOCYTE AUTOMATED: 18.9 %
LYMPHOCYTES: 1 (ref 0.78–3.73)
MEAN CORPUSCULAR HGB: 33.3 PG (ref 26–34)
MEAN CORPUSCULAR VOL: 93.3 FL (ref 80–100)
MEAN PLATELET VOLUME: 6.7 FL (ref 6.8–10.2)
MONOCYTE AUTOMATED: 13 %
MONOCYTES: 0.7 (ref 0.17–1)
NA (SODIUM, SERUM): 126 MMOL/L (ref 133–144)
NA (SODIUM, SERUM): 127 MMOL/L (ref 133–144)
NEUTROPHIL ABSOLUTE: 3.6 (ref 1.91–7.6)
NEUTROPHIL AUTOMATED: 66.2 %
OSMOLALITY, RANDOM URINE: 337 MOSM/KG (ref 50–1200)
OSMOLALITY, SERUM: 264 UOSM/KG (ref 273–309)
PLATELET COUNT: 176 K/MM3 (ref 150–450)
PROTEIN TOTAL: 7 G/DL (ref 6.4–8.9)
PROTEIN TOTAL: 7 G/DL (ref 6.4–8.9)
RED BLOOD CELL COUNT: 3.98 M/MM3 (ref 4.34–5.6)
RED CELL DISTRIBUTIO: 14.3 % (ref 11.9–15.9)
SODIUM, RANDOM URINE: 50.7 MEQ/L (ref 40–220)
THYROID STIMULATING: 1.07 MIU/ML (ref 0.27–4.2)
URIC ACID: 3.5 MG/DL (ref 4.4–7.6)
WHITE BLOOD CELL COU: 5.4 K/MM3 (ref 4–11)

## 2021-07-11 PROCEDURE — 99222 1ST HOSP IP/OBS MODERATE 55: CPT | Performed by: INTERNAL MEDICINE

## 2021-07-12 LAB
*MEAN CORPUSCULAR HGB CONC: 33.6 G/DL (ref 32.5–35.8)
A/G RATIO: 1.34 (ref 1.1–2.4)
ALANINE AMINOTRANSFE: 78 U/L (ref 7–52)
ALBUMIN, SERUM (ALB): 3.9 G/DL (ref 3.5–5.7)
ALKALINE PHOSPHATASE (ALK): 92 U/L (ref 34–104)
ANION GAP: 8 MEQ/L (ref 6.2–14.7)
ASPARTATE AMINOTRANS: 60 U/L (ref 13–39)
BASOPHIL AUTOMATED: 0.8 %
BASOPHILS: 0 (ref 0–0.14)
BILIRUBIN, TOTAL: 1 MG/DL (ref 0.2–0.8)
BLOOD UREA NITROGEN (BUN): 8 MG/DL (ref 7–25)
BUN/CREATININE RATIO: 13.6 (ref 7.4–23)
CALCIUM, SERUM: 9.2 MG/DL (ref 8.6–10.3)
CARBON DIOXIDE: 24 MEQ/L (ref 21–31)
CHLORIDE, SERUM: 99 MMOL/L (ref 98–107)
CREATININE: 0.59 MG/DL (ref 0.7–1.3)
EOSINOPHIL AUTOMATED: 1.5 %
EOSINOPHILS: 0.1 (ref 0–0.6)
EST GLOMERULAR FILTRATION RATE: > 60 ML/MIN
GLUCOSE: 94 MG/DL (ref 70–99)
HEMATOCRIT: 37.5 % (ref 38.6–49.2)
HEMOGLOBIN: 12.6 GM/DL (ref 13–17)
K (POTASSIUM, SERUM): 3.9 MMOL/L (ref 3.5–5.2)
LYMPHOCYTE AUTOMATED: 19 %
LYMPHOCYTES: 0.9 (ref 0.78–3.73)
MEAN CORPUSCULAR HGB: 32.3 PG (ref 26–34)
MEAN CORPUSCULAR VOL: 96.3 FL (ref 80–100)
MEAN PLATELET VOLUME: 6.5 FL (ref 6.8–10.2)
MONOCYTE AUTOMATED: 15.3 %
MONOCYTES: 0.7 (ref 0.17–1)
NA (SODIUM, SERUM): 131 MMOL/L (ref 133–144)
NEUTROPHIL ABSOLUTE: 3 (ref 1.91–7.6)
NEUTROPHIL AUTOMATED: 63.4 %
PLATELET COUNT: 219 K/MM3 (ref 150–450)
PROTEIN TOTAL: 6.8 G/DL (ref 6.4–8.9)
RED BLOOD CELL COUNT: 3.89 M/MM3 (ref 4.34–5.6)
RED CELL DISTRIBUTIO: 14.7 % (ref 11.9–15.9)
WHITE BLOOD CELL COU: 4.7 K/MM3 (ref 4–11)

## 2021-07-12 PROCEDURE — 99232 SBSQ HOSP IP/OBS MODERATE 35: CPT | Performed by: INTERNAL MEDICINE

## 2021-07-13 LAB
APPEARANCE: CLEAR
BACTERIA: ABNORMAL /HPF
BILIRUBIN: ABNORMAL
COLOR: ABNORMAL
CULTURE REFLEX COMMENT: YES
GLUCOSE, URINE, AUTOMATED: ABNORMAL MG/DL
KETONES, URINE, AUTOMATED: ABNORMAL MG/DL
LEUKOCYTE, URINE, AUTOMATED: ABNORMAL
NITRITE, URINE AUTOMATED: POSITIVE
PH, URINE: 7 (ref 5–8)
PROTEIN, URINE: ABNORMAL MG/DL
RBC: ABNORMAL /HPF (ref 0–2)
SPECIFIC GRAVITY UA: 1.02 (ref 1–1.03)
SQUAMOUS EPITHELIAL: ABNORMAL /LPF
URINE, BLOOD, AUTOMATED: ABNORMAL
UROBILINOGEN, URINE, AUTOMATED: 4 MG/DL
WBC: ABNORMAL /HPF (ref 0–5)

## 2021-07-13 PROCEDURE — 99232 SBSQ HOSP IP/OBS MODERATE 35: CPT | Performed by: INTERNAL MEDICINE

## 2021-07-14 LAB
*MEAN CORPUSCULAR HGB CONC: 34 G/DL (ref 32.5–35.8)
A/G RATIO: 1.27 (ref 1.1–2.4)
ALANINE AMINOTRANSFE: 44 U/L (ref 7–52)
ALBUMIN, SERUM (ALB): 4.2 G/DL (ref 3.5–5.7)
ALKALINE PHOSPHATASE (ALK): 86 U/L (ref 34–104)
ANION GAP: 10 MEQ/L (ref 6.2–14.7)
ASPARTATE AMINOTRANS: 25 U/L (ref 13–39)
BASOPHIL AUTOMATED: 0.1 %
BASOPHILS: 0 (ref 0–0.14)
BILIRUBIN, TOTAL: 1.2 MG/DL (ref 0.2–0.8)
BLOOD UREA NITROGEN (BUN): 8 MG/DL (ref 7–25)
BUN/CREATININE RATIO: 11.6 (ref 7.4–23)
CALCIUM, SERUM: 9.6 MG/DL (ref 8.6–10.3)
CARBON DIOXIDE: 23 MEQ/L (ref 21–31)
CHLORIDE, SERUM: 96 MMOL/L (ref 98–107)
CREATININE: 0.69 MG/DL (ref 0.7–1.3)
EOSINOPHIL AUTOMATED: 0.1 %
EOSINOPHILS: 0 (ref 0–0.6)
EST GLOMERULAR FILTRATION RATE: > 60 ML/MIN
GLUCOSE: 99 MG/DL (ref 70–99)
HEMATOCRIT: 38.1 % (ref 38.6–49.2)
HEMOGLOBIN: 12.9 GM/DL (ref 13–17)
K (POTASSIUM, SERUM): 4.2 MMOL/L (ref 3.5–5.2)
LYMPHOCYTE AUTOMATED: 7.9 %
LYMPHOCYTES: 0.9 (ref 0.78–3.73)
MEAN CORPUSCULAR HGB: 32.6 PG (ref 26–34)
MEAN CORPUSCULAR VOL: 96 FL (ref 80–100)
MEAN PLATELET VOLUME: 6.4 FL (ref 6.8–10.2)
MONOCYTE AUTOMATED: 15.5 %
MONOCYTES: 1.8 (ref 0.17–1)
NA (SODIUM, SERUM): 129 MMOL/L (ref 133–144)
NEUTROPHIL ABSOLUTE: 8.7 (ref 1.91–7.6)
NEUTROPHIL AUTOMATED: 76.4 %
PLATELET COUNT: 279 K/MM3 (ref 150–450)
PROTEIN TOTAL: 7.5 G/DL (ref 6.4–8.9)
RED BLOOD CELL COUNT: 3.96 M/MM3 (ref 4.34–5.6)
RED CELL DISTRIBUTIO: 14.6 % (ref 11.9–15.9)
WHITE BLOOD CELL COU: 11.4 K/MM3 (ref 4–11)

## 2021-07-14 PROCEDURE — 99232 SBSQ HOSP IP/OBS MODERATE 35: CPT | Performed by: INTERNAL MEDICINE

## 2021-07-15 LAB
*MEAN CORPUSCULAR HGB CONC: 35.1 G/DL (ref 32.5–35.8)
AMOXACILLIN/CLAVULANATE: ABNORMAL
AMPICILLIN/SULBACTAM: ABNORMAL
ANION GAP: 7 MEQ/L (ref 6.2–14.7)
BASOPHIL AUTOMATED: 0.9 %
BASOPHILS: 0 (ref 0–0.14)
BLOOD UREA NITROGEN (BUN): 9 MG/DL (ref 7–25)
BUN/CREATININE RATIO: 14.3 (ref 7.4–23)
CALCIUM, SERUM: 8.9 MG/DL (ref 8.6–10.3)
CARBON DIOXIDE: 24 MEQ/L (ref 21–31)
CEFAZOLIN: <=2
CHLORIDE, SERUM: 94 MMOL/L (ref 98–107)
CIPROFLOXACIN: <=1
CREATININE: 0.63 MG/DL (ref 0.7–1.3)
EOSINOPHIL AUTOMATED: 0.6 %
EOSINOPHILS: 0 (ref 0–0.6)
ERTAPENEM: <=0.5
EST GLOMERULAR FILTRATION RATE: > 60 ML/MIN
GENTAMICIN: <=1
GLUCOSE: 94 MG/DL (ref 70–99)
HEMATOCRIT: 33.1 % (ref 38.6–49.2)
HEMOGLOBIN: 11.6 GM/DL (ref 13–17)
IMIPENEM: <=0.5
K (POTASSIUM, SERUM): 3.5 MMOL/L (ref 3.5–5.2)
LEVOFLOXACIN: <=0.25
LYMPHOCYTE AUTOMATED: 11.4 %
LYMPHOCYTES: 0.6 (ref 0.78–3.73)
MEAN CORPUSCULAR HGB: 32.9 PG (ref 26–34)
MEAN CORPUSCULAR VOL: 93.5 FL (ref 80–100)
MEAN PLATELET VOLUME: 6.9 FL (ref 6.8–10.2)
MEROPENEM: <=1
MONOCYTE AUTOMATED: 16.7 %
MONOCYTES: 0.9 (ref 0.17–1)
NA (SODIUM, SERUM): 125 MMOL/L (ref 133–144)
NEUTROPHIL ABSOLUTE: 3.9 (ref 1.91–7.6)
NEUTROPHIL AUTOMATED: 70.4 %
NITROFURANTOIN: 64
PIPERACILLIN/TAZOBACTAM: <=4
PLATELET COUNT: 290 K/MM3 (ref 150–450)
RED BLOOD CELL COUNT: 3.54 M/MM3 (ref 4.34–5.6)
RED CELL DISTRIBUTIO: 14.3 % (ref 11.9–15.9)
TETRACYCLINE: <=4
TRIMETHOPRIM/SULFAMETHOXAZOLE: ABNORMAL
URINE CULTURE: NORMAL
URINE CULTURE: NORMAL
WHITE BLOOD CELL COU: 5.5 K/MM3 (ref 4–11)

## 2021-07-15 PROCEDURE — 99232 SBSQ HOSP IP/OBS MODERATE 35: CPT | Performed by: INTERNAL MEDICINE

## 2021-07-16 LAB
*MEAN CORPUSCULAR HGB CONC: 35.1 G/DL (ref 32.5–35.8)
ANION GAP: 8 MEQ/L (ref 6.2–14.7)
BAND MANUAL: 3 % (ref 0–4)
BLOOD UREA NITROGEN (BUN): 19 MG/DL (ref 7–25)
BUN/CREATININE RATIO: 30.2 (ref 7.4–23)
CALCIUM, SERUM: 9.4 MG/DL (ref 8.6–10.3)
CARBON DIOXIDE: 26 MEQ/L (ref 21–31)
CHLORIDE, SERUM: 98 MMOL/L (ref 98–107)
CREATININE: 0.63 MG/DL (ref 0.7–1.3)
EST GLOMERULAR FILTRATION RATE: > 60 ML/MIN
GLUCOSE: 94 MG/DL (ref 70–99)
HEMATOCRIT: 36.2 % (ref 38.6–49.2)
HEMOGLOBIN: 12.7 GM/DL (ref 13–17)
INFECTIOUS MONONUCLE: 20 % (ref 3–13)
K (POTASSIUM, SERUM): 3.5 MMOL/L (ref 3.5–5.2)
LYMPHOCYTE MANUAL: 20 % (ref 10–47)
MEAN CORPUSCULAR HGB: 32.9 PG (ref 26–34)
MEAN CORPUSCULAR VOL: 93.7 FL (ref 80–100)
MEAN PLATELET VOLUME: 7.1 FL (ref 6.8–10.2)
NA (SODIUM, SERUM): 132 MMOL/L (ref 133–144)
NEUTROPHILS # CALCULATED: 2.3 K/MM3
NEUTROPHILS: 57 % (ref 33–82)
PLATELET COMMENT: ABNORMAL
PLATELET COUNT: 397 K/MM3 (ref 150–450)
PLATELET MORPHOLOGY: ABNORMAL
RBC MORPHOLOGY: NORMAL
RED BLOOD CELL COUNT: 3.87 M/MM3 (ref 4.34–5.6)
RED CELL DISTRIBUTIO: 14.2 % (ref 11.9–15.9)
WHITE BLOOD CELL COU: 3.9 K/MM3 (ref 4–11)

## 2021-07-16 PROCEDURE — 99232 SBSQ HOSP IP/OBS MODERATE 35: CPT | Performed by: INTERNAL MEDICINE

## 2021-07-17 PROCEDURE — 99232 SBSQ HOSP IP/OBS MODERATE 35: CPT | Performed by: INTERNAL MEDICINE

## 2021-07-18 LAB
*MEAN CORPUSCULAR HGB CONC: 36.3 G/DL (ref 32.5–35.8)
ANION GAP: 9 MEQ/L (ref 6.2–14.7)
BASOPHIL AUTOMATED: 2.3 %
BASOPHILS: 0.2 (ref 0–0.14)
BLOOD CULTURE: NORMAL
BLOOD CULTURE: NORMAL
BLOOD UREA NITROGEN (BUN): 21 MG/DL (ref 7–25)
BUN/CREATININE RATIO: 32.3 (ref 7.4–23)
CALCIUM, SERUM: 9.4 MG/DL (ref 8.6–10.3)
CARBON DIOXIDE: 27 MEQ/L (ref 21–31)
CHLORIDE, SERUM: 98 MMOL/L (ref 98–107)
CREATININE: 0.65 MG/DL (ref 0.7–1.3)
EOSINOPHIL AUTOMATED: 1.1 %
EOSINOPHILS: 0.1 (ref 0–0.6)
EST GLOMERULAR FILTRATION RATE: > 60 ML/MIN
GLUCOSE: 93 MG/DL (ref 70–99)
HEMATOCRIT: 33.4 % (ref 38.6–49.2)
HEMOGLOBIN: 12.1 GM/DL (ref 13–17)
K (POTASSIUM, SERUM): 3.6 MMOL/L (ref 3.5–5.2)
LYMPHOCYTE AUTOMATED: 22.2 %
LYMPHOCYTES: 1.5 (ref 0.78–3.73)
MEAN CORPUSCULAR HGB: 33.4 PG (ref 26–34)
MEAN CORPUSCULAR VOL: 92.1 FL (ref 80–100)
MEAN PLATELET VOLUME: 5.9 FL (ref 6.8–10.2)
MG (MAGNESIUM, SERUM: 1.9 MG/DL (ref 1.6–2.6)
MONOCYTE AUTOMATED: 12.7 %
MONOCYTES: 0.8 (ref 0.17–1)
NA (SODIUM, SERUM): 134 MMOL/L (ref 133–144)
NEUTROPHIL ABSOLUTE: 4.1 (ref 1.91–7.6)
NEUTROPHIL AUTOMATED: 61.7 %
PHOSPHORUS, SERUM: 3.8 MG/DL (ref 2.5–4.5)
PLATELET COUNT: 562 K/MM3 (ref 150–450)
RED BLOOD CELL COUNT: 3.62 M/MM3 (ref 4.34–5.6)
RED CELL DISTRIBUTIO: 14.6 % (ref 11.9–15.9)
WHITE BLOOD CELL COU: 6.6 K/MM3 (ref 4–11)

## 2021-07-18 PROCEDURE — 99232 SBSQ HOSP IP/OBS MODERATE 35: CPT | Performed by: INTERNAL MEDICINE

## 2023-11-30 PROCEDURE — 93010 ELECTROCARDIOGRAM REPORT: CPT | Performed by: INTERNAL MEDICINE

## 2023-12-01 PROCEDURE — 93010 ELECTROCARDIOGRAM REPORT: CPT | Performed by: INTERNAL MEDICINE

## 2025-05-08 ENCOUNTER — HOSPITAL ENCOUNTER (EMERGENCY)
Facility: HOSPITAL | Age: 65
Discharge: HOME | End: 2025-05-08
Attending: STUDENT IN AN ORGANIZED HEALTH CARE EDUCATION/TRAINING PROGRAM
Payer: MEDICAID

## 2025-05-08 VITALS
SYSTOLIC BLOOD PRESSURE: 137 MMHG | TEMPERATURE: 99 F | WEIGHT: 150 LBS | OXYGEN SATURATION: 99 % | DIASTOLIC BLOOD PRESSURE: 71 MMHG | RESPIRATION RATE: 17 BRPM | HEART RATE: 80 BPM | BODY MASS INDEX: 22 KG/M2

## 2025-05-08 DIAGNOSIS — I10 HYPERTENSION, UNSPECIFIED TYPE: ICD-10-CM

## 2025-05-08 DIAGNOSIS — K20.90 ESOPHAGITIS: ICD-10-CM

## 2025-05-08 DIAGNOSIS — E83.39 HYPOPHOSPHATEMIA: Primary | ICD-10-CM

## 2025-05-08 LAB
ALBUMIN SERPL-MCNC: 5.3 G/DL (ref 3.2–4.8)
ALBUMIN/GLOB SERPL: 2 {RATIO} (ref 1–2)
ALP LIVER SERPL-CCNC: 100 U/L (ref 45–117)
ALT SERPL-CCNC: 22 U/L (ref 10–49)
ANION GAP SERPL CALC-SCNC: 12 MMOL/L (ref 0–18)
AST SERPL-CCNC: 49 U/L (ref ?–34)
ATRIAL RATE: 85 BPM
BASOPHILS # BLD AUTO: 0.07 X10(3) UL (ref 0–0.2)
BASOPHILS NFR BLD AUTO: 1.5 %
BILIRUB SERPL-MCNC: 0.8 MG/DL (ref 0.2–1.1)
BUN BLD-MCNC: 7 MG/DL (ref 9–23)
CALCIUM BLD-MCNC: 10.3 MG/DL (ref 8.7–10.6)
CHLORIDE SERPL-SCNC: 98 MMOL/L (ref 98–112)
CO2 SERPL-SCNC: 21 MMOL/L (ref 21–32)
CREAT BLD-MCNC: 0.88 MG/DL (ref 0.7–1.3)
EGFRCR SERPLBLD CKD-EPI 2021: 96 ML/MIN/1.73M2 (ref 60–?)
EOSINOPHIL # BLD AUTO: 0.04 X10(3) UL (ref 0–0.7)
EOSINOPHIL NFR BLD AUTO: 0.9 %
ERYTHROCYTE [DISTWIDTH] IN BLOOD BY AUTOMATED COUNT: 13.3 %
ETHANOL SERPL-MCNC: 16 MG/DL (ref ?–3)
GLOBULIN PLAS-MCNC: 2.6 G/DL (ref 2–3.5)
GLUCOSE BLD-MCNC: 110 MG/DL (ref 70–99)
HCT VFR BLD AUTO: 38.3 % (ref 39–53)
HGB BLD-MCNC: 14.1 G/DL (ref 13–17.5)
IMM GRANULOCYTES # BLD AUTO: 0.02 X10(3) UL (ref 0–1)
IMM GRANULOCYTES NFR BLD: 0.4 %
LIPASE SERPL-CCNC: 57 U/L (ref 12–53)
LYMPHOCYTES # BLD AUTO: 0.94 X10(3) UL (ref 1–4)
LYMPHOCYTES NFR BLD AUTO: 20.1 %
MAGNESIUM SERPL-MCNC: 1.7 MG/DL (ref 1.6–2.6)
MCH RBC QN AUTO: 31.3 PG (ref 26–34)
MCHC RBC AUTO-ENTMCNC: 36.8 G/DL (ref 31–37)
MCV RBC AUTO: 85.1 FL (ref 80–100)
MONOCYTES # BLD AUTO: 0.31 X10(3) UL (ref 0.1–1)
MONOCYTES NFR BLD AUTO: 6.6 %
NEUTROPHILS # BLD AUTO: 3.29 X10 (3) UL (ref 1.5–7.7)
NEUTROPHILS # BLD AUTO: 3.29 X10(3) UL (ref 1.5–7.7)
NEUTROPHILS NFR BLD AUTO: 70.5 %
OSMOLALITY SERPL CALC.SUM OF ELEC: 271 MOSM/KG (ref 275–295)
P AXIS: 68 DEGREES
P-R INTERVAL: 202 MS
PHOSPHATE SERPL-MCNC: 2.2 MG/DL (ref 2.4–5.1)
PLATELET # BLD AUTO: 406 10(3)UL (ref 150–450)
POTASSIUM SERPL-SCNC: 4.9 MMOL/L (ref 3.5–5.1)
PROT SERPL-MCNC: 7.9 G/DL (ref 5.7–8.2)
Q-T INTERVAL: 336 MS
QRS DURATION: 96 MS
QTC CALCULATION (BEZET): 399 MS
R AXIS: 18 DEGREES
RBC # BLD AUTO: 4.5 X10(6)UL (ref 4.3–5.7)
SODIUM SERPL-SCNC: 131 MMOL/L (ref 136–145)
T AXIS: 54 DEGREES
TROPONIN I SERPL HS-MCNC: <3 NG/L (ref ?–53)
VENTRICULAR RATE: 85 BPM
WBC # BLD AUTO: 4.7 X10(3) UL (ref 4–11)

## 2025-05-08 PROCEDURE — 85025 COMPLETE CBC W/AUTO DIFF WBC: CPT | Performed by: STUDENT IN AN ORGANIZED HEALTH CARE EDUCATION/TRAINING PROGRAM

## 2025-05-08 PROCEDURE — 93010 ELECTROCARDIOGRAM REPORT: CPT

## 2025-05-08 PROCEDURE — 84484 ASSAY OF TROPONIN QUANT: CPT | Performed by: STUDENT IN AN ORGANIZED HEALTH CARE EDUCATION/TRAINING PROGRAM

## 2025-05-08 PROCEDURE — 84100 ASSAY OF PHOSPHORUS: CPT | Performed by: STUDENT IN AN ORGANIZED HEALTH CARE EDUCATION/TRAINING PROGRAM

## 2025-05-08 PROCEDURE — 93005 ELECTROCARDIOGRAM TRACING: CPT

## 2025-05-08 PROCEDURE — 99285 EMERGENCY DEPT VISIT HI MDM: CPT

## 2025-05-08 PROCEDURE — 83735 ASSAY OF MAGNESIUM: CPT | Performed by: STUDENT IN AN ORGANIZED HEALTH CARE EDUCATION/TRAINING PROGRAM

## 2025-05-08 PROCEDURE — 80053 COMPREHEN METABOLIC PANEL: CPT | Performed by: STUDENT IN AN ORGANIZED HEALTH CARE EDUCATION/TRAINING PROGRAM

## 2025-05-08 PROCEDURE — 83690 ASSAY OF LIPASE: CPT | Performed by: STUDENT IN AN ORGANIZED HEALTH CARE EDUCATION/TRAINING PROGRAM

## 2025-05-08 PROCEDURE — 82077 ASSAY SPEC XCP UR&BREATH IA: CPT | Performed by: STUDENT IN AN ORGANIZED HEALTH CARE EDUCATION/TRAINING PROGRAM

## 2025-05-08 PROCEDURE — 96374 THER/PROPH/DIAG INJ IV PUSH: CPT

## 2025-05-08 PROCEDURE — 99284 EMERGENCY DEPT VISIT MOD MDM: CPT

## 2025-05-08 RX ORDER — PANTOPRAZOLE SODIUM 40 MG/1
40 TABLET, DELAYED RELEASE ORAL DAILY
Qty: 30 TABLET | Refills: 0 | Status: SHIPPED | OUTPATIENT
Start: 2025-05-08 | End: 2025-06-07

## 2025-05-08 RX ORDER — AMLODIPINE BESYLATE 5 MG/1
5 TABLET ORAL DAILY
Qty: 30 TABLET | Refills: 0 | Status: SHIPPED | OUTPATIENT
Start: 2025-05-08 | End: 2025-06-07

## 2025-05-08 NOTE — ED QUICK NOTES
Patient care endorsed from previous RN.   Rounding Completed    Plan of Care reviewed. Waiting for IV infusion to complete.  Elimination needs assessed.    Bed is locked and in lowest position.

## 2025-05-08 NOTE — ED PROVIDER NOTES
History     Chief Complaint   Patient presents with    Eval-D       HPI    65 year old male with history of alcohol use disorder brought in by friend for assessment.  Patient drinks alcohol daily, last drink was about 5 PM yesterday.  He is here for feeling \"blah,\" since getting up this morning.  States he is a bit tired but has no specific pain or fevers or vomiting, he just does not feel right.  States he ate half a big Mac yesterday.  Friend states that they were out at a store and he is just less chatty and less interactive than normal.  Patient notes he chronically has a sensation of sore throat, attempted antibiotics for pharyngitis which did not help.        Past Medical History[1]    Past Surgical History[2]    No pertinent social history.                 Physical Exam     ED Triage Vitals   BP 05/08/25 1041 (!) 171/97   Pulse 05/08/25 1041 101   Resp 05/08/25 1041 20   Temp 05/08/25 1041 98.7 °F (37.1 °C)   Temp src 05/08/25 1041 Temporal   SpO2 05/08/25 1043 99 %   O2 Device 05/08/25 1043 None (Room air)       Physical Exam  Constitutional:       General: He is not in acute distress.  HENT:      Mouth/Throat:      Pharynx: No posterior oropharyngeal erythema.   Eyes:      Extraocular Movements: Extraocular movements intact.   Cardiovascular:      Rate and Rhythm: Normal rate and regular rhythm.      Pulses: Normal pulses.   Pulmonary:      Effort: Pulmonary effort is normal. No respiratory distress.      Breath sounds: Normal breath sounds.   Abdominal:      General: Abdomen is flat. There is no distension.      Tenderness: There is no abdominal tenderness. There is no guarding.   Musculoskeletal:      Cervical back: Normal range of motion.   Skin:     General: Skin is warm.   Neurological:      General: No focal deficit present.      Mental Status: He is alert.              ED Course     Labs Reviewed   COMP METABOLIC PANEL (14) - Abnormal; Notable for the following components:       Result Value     Glucose 110 (*)     Sodium 131 (*)     BUN 7 (*)     Calculated Osmolality 271 (*)     AST 49 (*)     Albumin 5.3 (*)     All other components within normal limits   CBC WITH DIFFERENTIAL WITH PLATELET - Abnormal; Notable for the following components:    HCT 38.3 (*)     Lymphocyte Absolute 0.94 (*)     All other components within normal limits   LIPASE - Abnormal; Notable for the following components:    Lipase 57 (*)     All other components within normal limits   ETHYL ALCOHOL - Abnormal; Notable for the following components:    Ethyl Alcohol 16 (*)     All other components within normal limits   PHOSPHORUS - Abnormal; Notable for the following components:    Phosphorus 2.2 (*)     All other components within normal limits   TROPONIN I HIGH SENSITIVITY - Normal   MAGNESIUM - Normal   RAINBOW DRAW BLUE   RAINBOW DRAW GOLD     No results found.        MDM     Vitals:    05/08/25 1230 05/08/25 1300 05/08/25 1330 05/08/25 1400   BP: 151/87 (!) 161/86 (!) 161/84 151/86   Pulse: 83 78 86 83   Resp: 15 19 17 17   Temp:       TempSrc:       SpO2: 100% 100% 100% 100%   Weight:           Dehydration, electro abnormalities, cirrhosis on differential.  Per chart review patient has esophagitis, this is likely etiology for patient's sore throat    ED Course as of 05/08/25 1419  ------------------------------------------------------------  Time: 05/08 1123  Comment: EKG interpretation by me: EKG sinus rhythm at a rate of 85, axis nomal, no concerning acute ischemic ST changes  ------------------------------------------------------------  Time: 05/08 1226  Comment: Labs notable for mild hyponatremia and low phosphorus, will replete with sodium Phos.  Troponin negative, reassuring potassium and hemoglobin.  ------------------------------------------------------------  Time: 05/08 1979  Comment: Discussed findings with patient and friend at bedside.  Discussed that if he continues drinking he will need increased nutrition to  prevent this recurrence.  Patient is not ready for alcohol rehab at this time.  After infusion will plan for discharge home.  Has been persistently elevated with blood pressure and has a prior history of antihypertensive that he has not been on for some time.  Will start on low-dose amlodipine.  In addition we will start patient on PPI.  Advise he will need further workup and care by primary care physician.  We discussed return precautions.         Disposition and Plan     Clinical Impression:  1. Hypophosphatemia    2. Hypertension, unspecified type    3. Esophagitis        Disposition:  Discharge    Follow-up:  Rafiq Newell II  [address] [facility] [address]  [phone]    Follow up        Medications Prescribed:  Current Discharge Medication List        START taking these medications    Details   amLODIPine 5 MG Oral Tab Take 1 tablet (5 mg total) by mouth daily.  Qty: 30 tablet, Refills: 0      pantoprazole 40 MG Oral Tab EC Take 1 tablet (40 mg total) by mouth daily.  Qty: 30 tablet, Refills: 0                      [1]   Past Medical History:   Arrhythmia    MI IN SEPT 2018    Essential hypertension    Seizure disorder (HCC)    WHEN DETOXED   [2] History reviewed. No pertinent surgical history.

## 2025-05-08 NOTE — ED INITIAL ASSESSMENT (HPI)
65YM c/c of abd pain/ detox Pt state that he not feeling well Pt state that he feeling similar to previously when he detox Pt wife that pt been drinking more and last know drink was yesterday

## 2025-05-08 NOTE — ED QUICK NOTES
Report received from previous RN. Plan of care reviewed, infusion to be completed prior to discharge.

## 2025-05-08 NOTE — ED QUICK NOTES
RHIANNA okay with discharge prior to finishing infusion. Infusion ran for 3 out of the four hours.

## (undated) NOTE — IP AVS SNAPSHOT
[address]            (For Outpatient Use Only) Initial Admit Date: 8/17/2020   Inpt/Obs Admit Date: Inpt: 8/18/20 / Obs: 08/18/20   Discharge Date:    Lynette Henderson:  [de-identified]   MRN: [de-identified]   CSN: [id number]   CEID: [id number] Subscriber ID:  Pt Rel to Subscriber:    Hospital Account Financial Class: Medicaid Advantage    August 28, 2020

## (undated) NOTE — ED AVS SNAPSHOT
[facility] Emergency Department    [place]  [address] [address]    Phone:  [phone]    Fax:  [phone]           Anthony Shaikh   MRN: [id number]    Department:  [facility] Emergency Department   Date of Visit:  5/ coverage for follow-up care and referrals. [address] [facility] [address] [place] [phone]  Pediatric [phone] Emergency Department   [phone]       To Check ER Wait Times:  TEXT 'ERwait' to 35655      Click [email or web address] will be contacted. Please make sure we have your correct phone number before you leave. After you leave, you should follow the attached instructions. I have read and understand the instructions given to me by my caregivers.         24-Hour Pharmacies CT BRAIN OR HEAD (07310) (Final result) Result time:  05/24/17 12:00:11    Final result    Impression:    CONCLUSION:  No acute intracranial hemorrhage.            Dictated by: Elena Murphy MD on 5/24/2017 at 11:59       Approved by: Elena Murphy MD visit,  view other health information, and more. To sign up or find more information, go to [email or web address] and click on the Sign Up Now link in the [employer] box.      Enter your [employer] Activation Code exactly as it appears below along with yo

## (undated) NOTE — ED AVS SNAPSHOT
[facility] Emergency Department    [facility]  [address] [address]    Phone:  [phone]    Fax:  [phone]           Lisa Marie   MRN: [id number]    Department:  [facility] Emergency Department   Date of Visit:  5/ IF THERE IS ANY CHANGE OR WORSENING OF YOUR CONDITION, CALL YOUR PRIMARY CARE PHYSICIAN AT ONCE OR RETURN IMMEDIATELY TO THE EMERGENCY DEPARTMENT.     If you have been prescribed any medication(s), please fill your prescription right away and begin taking t

## (undated) NOTE — IP AVS SNAPSHOT
Patient Demographics     Address  [address] [place] [address] Phone  [phone] [facility])  [phone] [facility]      Emergency Contact(s)     Name Relation Home Work Cesar, [address] [facility]   [phone]      Allergies as of 8/28/2020 454-454-A - MAR ACTION REPORT  (last 24 hrs)    ** SITE UNKNOWN **     Order ID Medication Name Action Time Action Reason Comments    [id number] Ocuvite-Lutein (OCUVITE - EYE VITAMIN) tab 1 tablet 08/28/20 0843 Given      [id number] Pantopraz Chloride 101 98 - 112 mmol/L — [facility] ([facility])   CO2 23.0 21.0 - 32.0 mmol/L — [facility] ([facility])   Anion Gap 8 0 - 18 mmol/L — [facility])   BUN 8 7 - 18 mg/dL — [facility])   Creatinine 0.60 0.70 - 1.30 mg/dL  [address] [facility])   BUN/CREA Ratio 1 Blood Culture FREQ X 2 [[id number]] Collected:  08/22/20 2139    Order Status:  Completed Lab Status:  Final result Updated:  08/27/20 2201    Specimen:  Blood,peripheral      Blood Culture Result No Growth 5 Days    Blood Culture FREQ X 2 [[id number]] Alireza PPX:[DO.1] scds[DO. 2]    Aaron Howe DO  [facility] Hospitalist  [phone]    [facility] Hospitalist History and Physical[DO.1]      Patient presents with:  Chest Pain Angina[DO.3]       PCP:[DO.1] Madie Mckenna MD[DO.3]      History of Present Illness: Patient Heart:  Regular rate and rhythm, S1, S2 normal, no murmur, rub or gallop appreciated   Abdomen:   Soft, non-tender. Bowel sounds normal. No masses,  No organomegaly. Non distended   Extremities: Extremities normal, atraumatic, no cyanosis or edema.    Skin: Minimal peribronchial thickening bilaterally. There is respiratory motion in the lung bases. Bilateral subsegmental atelectasis. MEDIASTINUM:  No enlarged mediastinal or hilar adenopathy.   There is an air-fluid level in the mid esophagus consistent with Outpatient records or previous hospital records reviewed. [facility] hospitalist to continue to follow patient while in house  A total of[DO.1] 70[DO.2] minutes taken with patient and coordinating care. Greater than 50% face to face encounter. [DO.1] 10:36 AM    History of Present Illness:  63 yo[RH.1] male with severe alcohol use disorder presented to the ED on 8/17/20 with c/o dyspnea and palpitations. He was intoxicated with a BAL of 283. He was found to be COVID-19 positive.  He developed delirium t Social and Developmental History:[RH.1]  twice, remarried in 2018. Supportive wife. He has 4 step children from 2nd marriage and 2 from 3rd marriage. He works as a . [RH.3]     Past Medical History:   Diagnosis Date   • Arrhythmia     MI IN •  magnesium hydroxide (MILK OF MAGNESIA) 400 MG/5ML suspension 30 mL, 30 mL, Oral, Daily PRN  •  bisacodyl (DULCOLAX) rectal suppository 10 mg, 10 mg, Rectal, Daily PRN  •  Fleet Enema (FLEET) 7-19 GM/118ML enema 133 mL, 1 enema, Rectal, Once PRN  •  hydr TECHNIQUE:  Unenhanced followed by contrast-enhanced CT scanning is performed through the brain using nonionic contrast.  Dose reduction techniques were used.  Dose information is transmitted to the [facility]) Antoine Yo  [employer] [facility] 8/17/2020 with hyponatremia, found to be in alcohol withdrawal, + for COVID19. Pt has a past medical history significant for ETOH dependence. See below for detailed past medical/surgical history.      Problem List  Principal Problem:    Hyponatremia  Active CMS Modifier (G-Code): CK    FUNCTIONAL ABILITY STATUS  Gait Assessment   Gait Assistance: Minimum assistance  Distance (ft): 30  Assistive Device: Rolling walker  Pattern: R Foot flat;L Foot flat(impulsive)          Skilled Therapy Provided: (PPE worn: go the above deficits to assist patient in returning to prior to level of function. Subacute rehab is recommended for 11-13 days. After this period of rehabilitation patient should achieve supervision level in all mobility.   DISCHARGE RECOMMENDATIONS  PT Disc Problem List  Principal Problem:    Hyponatremia  Active Problems:    Alcohol dependence with uncomplicated intoxication (HCC)    Dyspnea, unspecified type    Palpitations      Past Medical History  Past Medical History:   Diagnosis Date   • Arrhythmia Assistive Device: Rolling walker  Pattern: (retropulsive)          Skilled Therapy Provided: (PPE worn: goggles, mask, gown, gloves) Pt received supine in bed and is agreeable to therapy. Pt supine-sit with supervision.  Pt demonstrates good static sitting PT Discharge Recommendations: Sub-acute rehabilitation(ELOS 11-13 days)     PLAN  PT Treatment Plan: Bed mobility; Body mechanics; Coordination; Endurance; Energy conservation;Patient education; Family education;Gait training;Neuromuscular re-educate;Range of m